# Patient Record
Sex: FEMALE | Race: WHITE | Employment: OTHER | URBAN - METROPOLITAN AREA
[De-identification: names, ages, dates, MRNs, and addresses within clinical notes are randomized per-mention and may not be internally consistent; named-entity substitution may affect disease eponyms.]

---

## 2017-03-08 ENCOUNTER — OP HISTORICAL/CONVERTED ENCOUNTER (OUTPATIENT)
Dept: OTHER | Age: 72
End: 2017-03-08

## 2017-05-23 ENCOUNTER — OP HISTORICAL/CONVERTED ENCOUNTER (OUTPATIENT)
Dept: OTHER | Age: 72
End: 2017-05-23

## 2018-11-05 ENCOUNTER — HOSPITAL ENCOUNTER (OUTPATIENT)
Dept: PREADMISSION TESTING | Age: 73
Discharge: HOME | End: 2018-11-05
Payer: MEDICARE

## 2018-11-05 VITALS
TEMPERATURE: 98.4 F | DIASTOLIC BLOOD PRESSURE: 75 MMHG | BODY MASS INDEX: 39.09 KG/M2 | HEIGHT: 64 IN | SYSTOLIC BLOOD PRESSURE: 146 MMHG | WEIGHT: 229 LBS | OXYGEN SATURATION: 97 % | RESPIRATION RATE: 14 BRPM | HEART RATE: 93 BPM

## 2018-11-05 LAB
ABO + RH BLD: NORMAL
ALBUMIN SERPL-MCNC: 3.7 G/DL (ref 3.5–5)
ALBUMIN/GLOB SERPL: 1.1 {RATIO} (ref 1.1–2.2)
ALP SERPL-CCNC: 82 U/L (ref 45–117)
ALT SERPL-CCNC: 30 U/L (ref 12–78)
ANION GAP SERPL CALC-SCNC: 11 MMOL/L (ref 5–15)
APPEARANCE UR: ABNORMAL
APTT PPP: 25.9 SEC (ref 22.1–32)
AST SERPL-CCNC: 25 U/L (ref 15–37)
ATRIAL RATE: 91 BPM
BACTERIA URNS QL MICRO: NEGATIVE /HPF
BASOPHILS # BLD: 0 K/UL (ref 0–0.1)
BASOPHILS NFR BLD: 1 % (ref 0–1)
BILIRUB SERPL-MCNC: 0.4 MG/DL (ref 0.2–1)
BILIRUB UR QL: NEGATIVE
BLOOD GROUP ANTIBODIES SERPL: NORMAL
BUN SERPL-MCNC: 9 MG/DL (ref 6–20)
BUN/CREAT SERPL: 13 (ref 12–20)
CALCIUM SERPL-MCNC: 9 MG/DL (ref 8.5–10.1)
CALCULATED P AXIS, ECG09: 58 DEGREES
CALCULATED R AXIS, ECG10: -25 DEGREES
CALCULATED T AXIS, ECG11: 57 DEGREES
CHLORIDE SERPL-SCNC: 102 MMOL/L (ref 97–108)
CO2 SERPL-SCNC: 28 MMOL/L (ref 21–32)
COLOR UR: ABNORMAL
CREAT SERPL-MCNC: 0.69 MG/DL (ref 0.55–1.02)
DIAGNOSIS, 93000: NORMAL
DIFFERENTIAL METHOD BLD: NORMAL
EOSINOPHIL # BLD: 0.2 K/UL (ref 0–0.4)
EOSINOPHIL NFR BLD: 2 % (ref 0–7)
EPITH CASTS URNS QL MICRO: ABNORMAL /LPF
ERYTHROCYTE [DISTWIDTH] IN BLOOD BY AUTOMATED COUNT: 14.1 % (ref 11.5–14.5)
EST. AVERAGE GLUCOSE BLD GHB EST-MCNC: 128 MG/DL
GLOBULIN SER CALC-MCNC: 3.4 G/DL (ref 2–4)
GLUCOSE SERPL-MCNC: 118 MG/DL (ref 65–100)
GLUCOSE UR STRIP.AUTO-MCNC: NEGATIVE MG/DL
HBA1C MFR BLD: 6.1 % (ref 4.2–6.3)
HCT VFR BLD AUTO: 41.4 % (ref 35–47)
HGB BLD-MCNC: 12.9 G/DL (ref 11.5–16)
HGB UR QL STRIP: NEGATIVE
HYALINE CASTS URNS QL MICRO: ABNORMAL /LPF (ref 0–5)
IMM GRANULOCYTES # BLD: 0 K/UL (ref 0–0.04)
IMM GRANULOCYTES NFR BLD AUTO: 0 % (ref 0–0.5)
INR PPP: 1 (ref 0.9–1.1)
KETONES UR QL STRIP.AUTO: NEGATIVE MG/DL
LEUKOCYTE ESTERASE UR QL STRIP.AUTO: NEGATIVE
LYMPHOCYTES # BLD: 1.9 K/UL (ref 0.8–3.5)
LYMPHOCYTES NFR BLD: 28 % (ref 12–49)
MCH RBC QN AUTO: 26.2 PG (ref 26–34)
MCHC RBC AUTO-ENTMCNC: 31.2 G/DL (ref 30–36.5)
MCV RBC AUTO: 84.1 FL (ref 80–99)
MONOCYTES # BLD: 0.7 K/UL (ref 0–1)
MONOCYTES NFR BLD: 10 % (ref 5–13)
NEUTS SEG # BLD: 4.1 K/UL (ref 1.8–8)
NEUTS SEG NFR BLD: 59 % (ref 32–75)
NITRITE UR QL STRIP.AUTO: NEGATIVE
NRBC # BLD: 0 K/UL (ref 0–0.01)
NRBC BLD-RTO: 0 PER 100 WBC
P-R INTERVAL, ECG05: 168 MS
PH UR STRIP: 7 [PH] (ref 5–8)
PLATELET # BLD AUTO: 184 K/UL (ref 150–400)
PMV BLD AUTO: 11.1 FL (ref 8.9–12.9)
POTASSIUM SERPL-SCNC: 3.3 MMOL/L (ref 3.5–5.1)
PROT SERPL-MCNC: 7.1 G/DL (ref 6.4–8.2)
PROT UR STRIP-MCNC: NEGATIVE MG/DL
PROTHROMBIN TIME: 10.4 SEC (ref 9–11.1)
Q-T INTERVAL, ECG07: 384 MS
QRS DURATION, ECG06: 78 MS
QTC CALCULATION (BEZET), ECG08: 472 MS
RBC # BLD AUTO: 4.92 M/UL (ref 3.8–5.2)
RBC #/AREA URNS HPF: ABNORMAL /HPF (ref 0–5)
SODIUM SERPL-SCNC: 141 MMOL/L (ref 136–145)
SP GR UR REFRACTOMETRY: <1.005 (ref 1–1.03)
SPECIMEN EXP DATE BLD: NORMAL
THERAPEUTIC RANGE,PTTT: NORMAL SECS (ref 58–77)
UA: UC IF INDICATED,UAUC: ABNORMAL
UROBILINOGEN UR QL STRIP.AUTO: 0.2 EU/DL (ref 0.2–1)
VENTRICULAR RATE, ECG03: 91 BPM
WBC # BLD AUTO: 6.9 K/UL (ref 3.6–11)
WBC URNS QL MICRO: ABNORMAL /HPF (ref 0–4)

## 2018-11-05 PROCEDURE — 93005 ELECTROCARDIOGRAM TRACING: CPT

## 2018-11-05 PROCEDURE — 81001 URINALYSIS AUTO W/SCOPE: CPT | Performed by: ORTHOPAEDIC SURGERY

## 2018-11-05 PROCEDURE — 86900 BLOOD TYPING SEROLOGIC ABO: CPT | Performed by: ORTHOPAEDIC SURGERY

## 2018-11-05 PROCEDURE — 36415 COLL VENOUS BLD VENIPUNCTURE: CPT | Performed by: ORTHOPAEDIC SURGERY

## 2018-11-05 PROCEDURE — 83036 HEMOGLOBIN GLYCOSYLATED A1C: CPT | Performed by: ORTHOPAEDIC SURGERY

## 2018-11-05 PROCEDURE — 85610 PROTHROMBIN TIME: CPT | Performed by: ORTHOPAEDIC SURGERY

## 2018-11-05 PROCEDURE — 85730 THROMBOPLASTIN TIME PARTIAL: CPT | Performed by: ORTHOPAEDIC SURGERY

## 2018-11-05 PROCEDURE — 80053 COMPREHEN METABOLIC PANEL: CPT | Performed by: ORTHOPAEDIC SURGERY

## 2018-11-05 PROCEDURE — 85025 COMPLETE CBC W/AUTO DIFF WBC: CPT | Performed by: ORTHOPAEDIC SURGERY

## 2018-11-05 RX ORDER — CHOLECALCIFEROL (VITAMIN D3) 125 MCG
4000 CAPSULE ORAL DAILY
COMMUNITY

## 2018-11-05 RX ORDER — BISMUTH SUBSALICYLATE 262 MG
1 TABLET,CHEWABLE ORAL DAILY
COMMUNITY

## 2018-11-05 RX ORDER — IBUPROFEN 200 MG
200 TABLET ORAL AS NEEDED
COMMUNITY
End: 2018-11-22

## 2018-11-05 RX ORDER — HYDROCHLOROTHIAZIDE 12.5 MG/1
12.5 CAPSULE ORAL
COMMUNITY

## 2018-11-05 RX ORDER — ALENDRONATE SODIUM 70 MG/1
70 TABLET ORAL
COMMUNITY

## 2018-11-05 NOTE — PERIOP NOTES
[address] [facility], [address]    MAIN OR [phone]    MAIN PRE OP [phone]    AMBULATORY PRE OP [phone]    PRE-ADMISSION TESTING [phone]       Surgery Date:   11/20/2018 Tuesday      Is surgery arrival time given by surgeon? NO  If NO, [facility] staff will call you between 3 and 7pm the day before your surgery with your arrival time. (If your surgery is on a Monday, we will call you the Friday before.)    Call [phone] after 7pm Monday-Friday if you did not receive your arrival time. Answers to Common Questions   When You  Arrive   Arrive at the [facility] [address] [facility] on the day of your surgery  Have your insurance card, photo ID, and any copayment (if needed)     Food   and   Drink   NO food or drink after midnight the night before surgery    This means NO water, gum, mints, coffee, juice, etc.  No alcohol (beer, wine, liquor) 24 hours before and after surgery     Medicine to   TAKE   Morning of Surgery   MEDICATIONS TO TAKE THE MORNING OF SURGERY WITH A SIP OF WATER:    Levothyroxine, Oxybutynin, Omeprazole. The morning of surgery ONLY do NOT take your Hydrochlorothiazide. Stop all vitamins, supplements, and ibuprofen 7 days before surgery.      Medicine  To  STOP   FOR PAIN   You can take Tylenol - follow instructions on the bottle   NO Aspirin for pain    NO Non-Steroidal Anti-Inflammatory Drugs (NSAIDs:   for example, Ibuprofen (Advil, Motrin), Naproxen (Aleve)   STOP herbal supplements and vitamins 1 week before surgery     Blood  Thinners    If you take Aspirin, Plavix, Coumadin, blood-thinning or anti-clot medicine, talk to your surgeon and/or follow the instructions from the doctor who told you to take that medicine     Clothing  Jewelry  Valuables  Bathing     CLOTHING   Wear loose, comfortable clothes   Wear glasses instead of contacts  [employer] money, jewelry and valuables at home   No make-up, particularly mascara, the day of surgery   REMOVE ALL piercings, rings, and jewelry - leave at home   Wear hair loose or down; no pony-tails, buns, or metal hair clips    BATHING   Follow all special bath instructions (for total joint replacement, spine and bowel surgeries.)   If you shower the morning of surgery, please do not apply any lotions, powders, or deodorants afterwards. Do not shave or trim anywhere 24 hours before surgery. Going Home  or  Spending the Night    SAME-DAY SURGERY: You must have a responsible adult drive you home and stay with you 24 hours after surgery   ADMITS: If your doctor is keeping you into the hospital after surgery, leave personal belongings/luggage in your car until you have a hospital room number. Hospital discharge time is 12 noon  Drivers must be here before 12 noon unless you are told differently         Follow all instructions so your surgery wont be cancelled. Please, be on time. If a situation occurs and you are delayed the day of surgery, call [phone] or [phone]. If your physical condition changes (like a fever, cold, flu, etc.) call your surgeon as soon as possible. The Preadmission Testing staff can be reached at [phone]. OTHER SPECIAL INSTRUCTIONS:    · Use Chlorhexidine Care Fusion wash and sponges 3 days prior to surgery as instructed. · Incentive spirometer given with instructions to practice at home and bring back to the hospital on the day of surgery. · [facility] will contact you if your Hemoglobin A1C is greater than 7.5. · Ensure/Glucerna  sample, nutritional information, and Ensure/Glucerna coupon given. · Pain pamphlet and Call Don't Fall reminder reviewed with patient.   ·  parking is complimentary Monday - Friday 7 am - 5 pm  · Bring PTA Medication list day of surgery with the last doses taken documented      Do not bring medication bottles the day of surgery    The patient was contacted  in person. She  verbalize  understanding of all instructions and does not  need reinforcement.

## 2018-11-05 NOTE — H&P
PAT Pre-Op History & Physical    Patient: Rupa Barboza                  MRN: [id number]          SSN: [id number]  YOB: 1945          Age: 71 y.o. Sex: female                Subjective:   Patient is a 71 y.o.  female who presents with history of chronic left knee pain that escalated after she had her right knee replaced 4/2018. States that her left knee pain is intermittent aching pain that she rates as high as 4/10 at times. Walking on uneven surfaces (grass for example) exacerbates her knee pain. She cannot walk as much as she would like due to her left knee pain. She has failed NSAIDs. The patient was evaluated in the surgeon's office and it was determined that the most appropriate plan of care is to proceed with surgical intervention. Patient's PCP Arsenio Chandler MD      Past Medical History:   Diagnosis Date    Arthritis     Breast cancer, left breast ([facility] [address] 1/23/2012    Diverticulosis     GERD (gastroesophageal reflux disease)     Hyperlipemia     Hypertension     Hypothyroidism     Obesity (BMI 30-39.9) 11/05/2018    BMI= 39.9    Phlebitis     Varicose veins of both lower extremities       Past Surgical History:   Procedure Laterality Date    HX APPENDECTOMY  1966    HX BREAST LUMPECTOMY Left 2012    also had lymph removed    HX COLONOSCOPY  2018    HX ORTHOPAEDIC Left 2002    L BUNIONECTOMY/HAMMERTOES    HX ORTHOPAEDIC  2010    repair hammertoes    HX ORTHOPAEDIC  2000    remove corns on both pinky toes    HX OTHER SURGICAL Bilateral     vein stripping    HX TUBAL LIGATION  1978      Prior to Admission medications    Medication Sig Start Date End Date Taking? Authorizing Provider   hydroCHLOROthiazide (MICROZIDE) 12.5 mg capsule Take 12.5 mg by mouth every morning. Yes Provider, Historical   alendronate (FOSAMAX) 70 mg tablet Take 70 mg by mouth every seven (7) days.    Yes Provider, Historical   multivitamin (ONE A DAY) tablet Take 1 Tab by mouth daily. Yes Provider, Historical   cholecalciferol, vitamin D3, (VITAMIN D3) 2,000 unit tab Take 4,000 Units by mouth daily. Yes Provider, Historical   OTHER Take 450 mg by mouth daily. \"Cranberry with vitamin C\"   Yes Provider, Historical   OTHER Take 2,000 mg by mouth three (3) days a week. \"Cinnamon with chromium\"   Yes Provider, Historical   FLAXSEED OIL-OMEGA 3,6,9 PO Take 2 Caps by mouth daily. Yes Provider, Historical   docosahexanoic acid/epa (FISH OIL PO) Take 2 Caps by mouth daily. Yes Provider, Historical   ibuprofen (MOTRIN) 200 mg tablet Take 200 mg by mouth as needed for Pain. Yes Provider, Historical   Levothyroxine 100 mcg cap Take 100 mcg by mouth Daily (before breakfast). Yes Provider, Historical   rosuvastatin (CRESTOR) 5 mg tablet Take 5 mg by mouth three (3) days a week. Yes Provider, Historical   oxybutynin (DITROPAN) 5 mg tablet Take 5 mg by mouth two (2) times a day. Yes Provider, Historical   omeprazole (PRILOSEC) 20 mg capsule Take 20 mg by mouth daily. Yes Provider, Historical     Current Outpatient Medications   Medication Sig    hydroCHLOROthiazide (MICROZIDE) 12.5 mg capsule Take 12.5 mg by mouth every morning.  alendronate (FOSAMAX) 70 mg tablet Take 70 mg by mouth every seven (7) days.  multivitamin (ONE A DAY) tablet Take 1 Tab by mouth daily.  cholecalciferol, vitamin D3, (VITAMIN D3) 2,000 unit tab Take 4,000 Units by mouth daily.  OTHER Take 450 mg by mouth daily. \"Cranberry with vitamin C\"    OTHER Take 2,000 mg by mouth three (3) days a week. \"Cinnamon with chromium\"    FLAXSEED OIL-OMEGA 3,6,9 PO Take 2 Caps by mouth daily.  docosahexanoic acid/epa (FISH OIL PO) Take 2 Caps by mouth daily.  ibuprofen (MOTRIN) 200 mg tablet Take 200 mg by mouth as needed for Pain.  Levothyroxine 100 mcg cap Take 100 mcg by mouth Daily (before breakfast).  rosuvastatin (CRESTOR) 5 mg tablet Take 5 mg by mouth three (3) days a week.     oxybutynin (DITROPAN) 5 mg tablet Take 5 mg by mouth two (2) times a day.  omeprazole (PRILOSEC) 20 mg capsule Take 20 mg by mouth daily. No current facility-administered medications for this encounter. Allergies   Allergen Reactions    Sulfa (Sulfonamide Antibiotics) Other (comments)     MOTHER EXTREMELY ALLERGIC-TOLD NOT TO TAKE      Social History     Tobacco Use    Smoking status: Former Smoker     Packs/day: 0.50     Years: 32.00     Pack years: 16.00     Last attempt to quit: 1998     Years since quittin.0    Smokeless tobacco: Never Used   Substance Use Topics    Alcohol use: Yes     Comment: rare- less than 1 drink/week      Social History     Substance and Sexual Activity   Drug Use No     Family History   Problem Relation Age of Onset    Other Daughter         FEELS LIKE COBWEBS ALL OVER HER    Alzheimer Mother     Ulcerative Colitis Mother     Dementia Father     Coronary Artery Disease Father         Bypass surgery  and stents    Arthritis-osteo Father     Arrhythmia Sister     Arthritis-osteo Sister     COPD Sister     Diabetes Sister     Arthritis-osteo Sister     Other Brother         PTSD, agent Orange exposure         Review of Systems    Patient denies difficulty swallowing, mouth sores, or loose teeth. Patient denies any recent dental procedures or any planned prior to surgery. Patient denies chest pain, tightness, pain radiating down left arm, palpitations. Denies dizziness, visual disturbances, or lightheadedness. Patient denies shortness of breath, wheezing, cough, fever, or chills. Patient denies diarrhea, constipation, or abdominal pain. Patient denies urinary problems including dysuria, hesitancy, or urgency. C/o stress incontinence at times. Denies skin breakdown, rashes, insect bites or open area. C/o left knee pain.         Objective:     Patient Vitals for the past 24 hrs:   Temp Pulse Resp BP SpO2   18 1307 98.4 °F (36.9 °C) 93 14 175/75 97 %     Temp (24hrs), Av.4 °F (36.9 °C), Min:98.4 °F (36.9 °C), Max:98.4 °F (36.9 °C)    Body mass index is 39.93 kg/m². Wt Readings from Last 1 Encounters:   18 103.9 kg (229 lb)        Physical Exam:     General: Pleasant,  cooperative, no apparent distress, appears stated age. Eyes: Conjunctivae/corneas clear. EOMs intact. Nose: Nares normal.   Mouth/Throat: Lips, mucosa, and tongue normal. Full upper and lower dentures in place. Neck: Supple, symmetrical, trachea midline. Back: Symmetric   Lungs: Clear to auscultation bilaterally. Heart: Regular rate and rhythm, S1, S2 normal. No murmur, click, rub or gallop. Abdomen: Soft, non-tender. Bowel sounds normal. No distention. Musculoskeletal:  Gait is antalgic. Extremities:  Extremities normal, atraumatic, no cyanosis. Trace ankle edema BLE. Calves                                 supple, non tender to palpation. Pulses: 2+ and symmetric bilateral upper extremities. Cap. refill <2 seconds   Skin: Skin color, texture, turgor normal.  No visible rashes or lesions. Neurologic: CN II-XII grossly intact. Alert and oriented x3. Labs:   Recent Results (from the past 72 hour(s))   CBC WITH AUTOMATED DIFF    Collection Time: 18  2:14 PM   Result Value Ref Range    WBC 6.9 3.6 - 11.0 K/uL    RBC 4.92 3.80 - 5.20 M/uL    HGB 12.9 11.5 - 16.0 g/dL    HCT 41.4 35.0 - 47.0 %    MCV 84.1 80.0 - 99.0 FL    MCH 26.2 26.0 - 34.0 PG    MCHC 31.2 30.0 - 36.5 g/dL    RDW 14.1 11.5 - 14.5 %    PLATELET 623 241 - 206 K/uL    MPV 11.1 8.9 - 12.9 FL    NRBC 0.0 0  WBC    ABSOLUTE NRBC 0.00 0.00 - 0.01 K/uL    NEUTROPHILS 59 32 - 75 %    LYMPHOCYTES 28 12 - 49 %    MONOCYTES 10 5 - 13 %    EOSINOPHILS 2 0 - 7 %    BASOPHILS 1 0 - 1 %    IMMATURE GRANULOCYTES 0 0.0 - 0.5 %    ABS. NEUTROPHILS 4.1 1.8 - 8.0 K/UL    ABS. LYMPHOCYTES 1.9 0.8 - 3.5 K/UL    ABS. MONOCYTES 0.7 0.0 - 1.0 K/UL    ABS. EOSINOPHILS 0.2 0.0 - 0.4 K/UL    ABS.  BASOPHILS 0.0 0.0 - 0.1 K/UL    ABS. IMM. GRANS. 0.0 0.00 - 0.04 K/UL    DF AUTOMATED     METABOLIC PANEL, COMPREHENSIVE    Collection Time: 11/05/18  2:14 PM   Result Value Ref Range    Sodium 141 136 - 145 mmol/L    Potassium 3.3 (L) 3.5 - 5.1 mmol/L    Chloride 102 97 - 108 mmol/L    CO2 28 21 - 32 mmol/L    Anion gap 11 5 - 15 mmol/L    Glucose 118 (H) 65 - 100 mg/dL    BUN 9 6 - 20 MG/DL    Creatinine 0.69 0.55 - 1.02 MG/DL    BUN/Creatinine ratio 13 12 - 20      GFR est AA >60 >60 ml/min/1.73m2    GFR est non-AA >60 >60 ml/min/1.73m2    Calcium 9.0 8.5 - 10.1 MG/DL    Bilirubin, total 0.4 0.2 - 1.0 MG/DL    ALT (SGPT) 30 12 - 78 U/L    AST (SGOT) 25 15 - 37 U/L    Alk.  phosphatase 82 45 - 117 U/L    Protein, total 7.1 6.4 - 8.2 g/dL    Albumin 3.7 3.5 - 5.0 g/dL    Globulin 3.4 2.0 - 4.0 g/dL    A-G Ratio 1.1 1.1 - 2.2     PROTHROMBIN TIME + INR    Collection Time: 11/05/18  2:14 PM   Result Value Ref Range    INR 1.0 0.9 - 1.1      Prothrombin time 10.4 9.0 - 11.1 sec   PTT    Collection Time: 11/05/18  2:14 PM   Result Value Ref Range    aPTT 25.9 22.1 - 32.0 sec    aPTT, therapeutic range     58.0 - 77.0 SECS   URINALYSIS W/ REFLEX CULTURE    Collection Time: 11/05/18  2:14 PM   Result Value Ref Range    Color YELLOW/STRAW      Appearance CLOUDY (A) CLEAR      Specific gravity <1.005 1.003 - 1.030    pH (UA) 7.0 5.0 - 8.0      Protein NEGATIVE  NEG mg/dL    Glucose NEGATIVE  NEG mg/dL    Ketone NEGATIVE  NEG mg/dL    Bilirubin NEGATIVE  NEG      Blood NEGATIVE  NEG      Urobilinogen 0.2 0.2 - 1.0 EU/dL    Nitrites NEGATIVE  NEG      Leukocyte Esterase NEGATIVE  NEG      WBC 0-4 0 - 4 /hpf    RBC 0-5 0 - 5 /hpf    Epithelial cells MODERATE (A) FEW /lpf    Bacteria NEGATIVE  NEG /hpf    UA:UC IF INDICATED CULTURE NOT INDICATED BY UA RESULT CNI      Hyaline cast 0-2 0 - 5 /lpf   TYPE & SCREEN    Collection Time: 11/05/18  2:14 PM   Result Value Ref Range    Crossmatch Expiration 11/19/2018     ABO/Rh(D) Melania Livingston POSITIVE Antibody screen NEG    EKG, 12 LEAD, INITIAL    Collection Time: 11/05/18  2:35 PM   Result Value Ref Range    Ventricular Rate 91 BPM    Atrial Rate 91 BPM    P-R Interval 168 ms    QRS Duration 78 ms    Q-T Interval 384 ms    QTC Calculation (Bezet) 472 ms    Calculated P Axis 58 degrees    Calculated R Axis -25 degrees    Calculated T Axis 57 degrees    Diagnosis       Normal sinus rhythm with sinus arrhythmia  Voltage criteria for left ventricular hypertrophy  Abnormal ECG  When compared with ECG of 13-JAN-2012 12:23,  No significant change was found  Confirmed by Marinell Kussmaul MD, [id number] ([id number]) on 11/5/2018 4:30:34 PM         Assessment:     Left knee osteoarthritis. History of lumpectomy and lymph node removal left breast.    Plan:     Scheduled for left total knee arthroplasty. Labs and EKG done per surgeon's orders. Lab results and EKG  reviewed- unremarkable. HgbA1c and MRSA pending. No BP/IV/Labs left arm.         Donal Diana NP

## 2018-11-06 LAB
BACTERIA SPEC CULT: NORMAL
BACTERIA SPEC CULT: NORMAL
SERVICE CMNT-IMP: NORMAL

## 2018-11-19 ENCOUNTER — ANESTHESIA EVENT (OUTPATIENT)
Dept: SURGERY | Age: 73
DRG: 470 | End: 2018-11-19
Payer: MEDICARE

## 2018-11-20 ENCOUNTER — ANESTHESIA (OUTPATIENT)
Dept: SURGERY | Age: 73
DRG: 470 | End: 2018-11-20
Payer: MEDICARE

## 2018-11-20 ENCOUNTER — HOSPITAL ENCOUNTER (INPATIENT)
Age: 73
LOS: 2 days | Discharge: HOME HEALTH | DRG: 470 | End: 2018-11-22
Attending: ORTHOPAEDIC SURGERY | Admitting: ORTHOPAEDIC SURGERY
Payer: MEDICARE

## 2018-11-20 ENCOUNTER — APPOINTMENT (OUTPATIENT)
Dept: GENERAL RADIOLOGY | Age: 73
DRG: 470 | End: 2018-11-20
Attending: ORTHOPAEDIC SURGERY
Payer: MEDICARE

## 2018-11-20 DIAGNOSIS — M17.12 ARTHRITIS OF KNEE, LEFT: Primary | ICD-10-CM

## 2018-11-20 PROBLEM — E03.9 ACQUIRED HYPOTHYROIDISM: Status: ACTIVE | Noted: 2018-11-20

## 2018-11-20 PROBLEM — I10 ESSENTIAL HYPERTENSION: Status: ACTIVE | Noted: 2018-11-20

## 2018-11-20 LAB
ABO + RH BLD: NORMAL
BLOOD GROUP ANTIBODIES SERPL: NORMAL
SPECIMEN EXP DATE BLD: NORMAL

## 2018-11-20 PROCEDURE — 74011250636 HC RX REV CODE- 250/636: Performed by: ORTHOPAEDIC SURGERY

## 2018-11-20 PROCEDURE — 77030019557 HC ELECTRD VES SEAL MEDT -F: Performed by: ORTHOPAEDIC SURGERY

## 2018-11-20 PROCEDURE — 77030019707 HC TBNG LAVG CRBJT KINM -C: Performed by: ORTHOPAEDIC SURGERY

## 2018-11-20 PROCEDURE — 97161 PT EVAL LOW COMPLEX 20 MIN: CPT

## 2018-11-20 PROCEDURE — 74011250636 HC RX REV CODE- 250/636: Performed by: ANESTHESIOLOGY

## 2018-11-20 PROCEDURE — 77030027138 HC INCENT SPIROMETER -A

## 2018-11-20 PROCEDURE — 76060000064 HC AMB SURG ANES 2 TO 2.5 HR: Performed by: ORTHOPAEDIC SURGERY

## 2018-11-20 PROCEDURE — 77030031139 HC SUT VCRL2 J&J -A: Performed by: ORTHOPAEDIC SURGERY

## 2018-11-20 PROCEDURE — 77030012935 HC DRSG AQUACEL BMS -B: Performed by: ORTHOPAEDIC SURGERY

## 2018-11-20 PROCEDURE — 74011250636 HC RX REV CODE- 250/636

## 2018-11-20 PROCEDURE — 77030018836 HC SOL IRR NACL ICUM -A: Performed by: ORTHOPAEDIC SURGERY

## 2018-11-20 PROCEDURE — 74011000250 HC RX REV CODE- 250

## 2018-11-20 PROCEDURE — C1713 ANCHOR/SCREW BN/BN,TIS/BN: HCPCS | Performed by: ORTHOPAEDIC SURGERY

## 2018-11-20 PROCEDURE — C1776 JOINT DEVICE (IMPLANTABLE): HCPCS | Performed by: ORTHOPAEDIC SURGERY

## 2018-11-20 PROCEDURE — 77030018822 HC SLV COMPR FT COVD -B

## 2018-11-20 PROCEDURE — 73560 X-RAY EXAM OF KNEE 1 OR 2: CPT

## 2018-11-20 PROCEDURE — 74011250637 HC RX REV CODE- 250/637: Performed by: ANESTHESIOLOGY

## 2018-11-20 PROCEDURE — 74011250637 HC RX REV CODE- 250/637: Performed by: ORTHOPAEDIC SURGERY

## 2018-11-20 PROCEDURE — 77030018842 HC SOL IRR SOD CL 9% BAXT -A: Performed by: ORTHOPAEDIC SURGERY

## 2018-11-20 PROCEDURE — 86901 BLOOD TYPING SEROLOGIC RH(D): CPT

## 2018-11-20 PROCEDURE — 77030007866 HC KT SPN ANES BBMI -B: Performed by: ANESTHESIOLOGY

## 2018-11-20 PROCEDURE — 77030000032 HC CUF TRNQT ZIMM -B: Performed by: ORTHOPAEDIC SURGERY

## 2018-11-20 PROCEDURE — 77030016547 HC BLD SAW SAG1 STRY -B: Performed by: ORTHOPAEDIC SURGERY

## 2018-11-20 PROCEDURE — 36415 COLL VENOUS BLD VENIPUNCTURE: CPT

## 2018-11-20 PROCEDURE — 77030020782 HC GWN BAIR PAWS FLX 3M -B

## 2018-11-20 PROCEDURE — 76030000021 HC AMB SURG 2 TO 2.5 HR INTENSV-TIER 1: Performed by: ORTHOPAEDIC SURGERY

## 2018-11-20 PROCEDURE — 77030003601 HC NDL NRV BLK BBMI -A: Performed by: ANESTHESIOLOGY

## 2018-11-20 PROCEDURE — 77030028907 HC WRP KNEE WO BGS SOLM -B

## 2018-11-20 PROCEDURE — 77030002933 HC SUT MCRYL J&J -A: Performed by: ORTHOPAEDIC SURGERY

## 2018-11-20 PROCEDURE — 77030033067 HC SUT PDO STRATFX SPIR J&J -B: Performed by: ORTHOPAEDIC SURGERY

## 2018-11-20 PROCEDURE — 77030013708 HC HNDPC SUC IRR PULS STRY –B: Performed by: ORTHOPAEDIC SURGERY

## 2018-11-20 PROCEDURE — 77030039266 HC ADH SKN EXOFIN S2SG -A: Performed by: ORTHOPAEDIC SURGERY

## 2018-11-20 PROCEDURE — 74011000250 HC RX REV CODE- 250: Performed by: ORTHOPAEDIC SURGERY

## 2018-11-20 PROCEDURE — 77030020268 HC MISC GENERAL SUPPLY: Performed by: ORTHOPAEDIC SURGERY

## 2018-11-20 PROCEDURE — 74011000272 HC RX REV CODE- 272: Performed by: ORTHOPAEDIC SURGERY

## 2018-11-20 PROCEDURE — 77030011640 HC PAD GRND REM COVD -A: Performed by: ORTHOPAEDIC SURGERY

## 2018-11-20 PROCEDURE — 76210000032 HC AMBSU PH I REC 3 TO 3.5 HR: Performed by: ORTHOPAEDIC SURGERY

## 2018-11-20 PROCEDURE — 0SRD0J9 REPLACEMENT OF LEFT KNEE JOINT WITH SYNTHETIC SUBSTITUTE, CEMENTED, OPEN APPROACH: ICD-10-PCS | Performed by: ORTHOPAEDIC SURGERY

## 2018-11-20 PROCEDURE — 97116 GAIT TRAINING THERAPY: CPT

## 2018-11-20 PROCEDURE — 3E0T3BZ INTRODUCTION OF ANESTHETIC AGENT INTO PERIPHERAL NERVES AND PLEXI, PERCUTANEOUS APPROACH: ICD-10-PCS | Performed by: ANESTHESIOLOGY

## 2018-11-20 PROCEDURE — 77030010782 HC BOWL MX BN ENCR -B: Performed by: ORTHOPAEDIC SURGERY

## 2018-11-20 PROCEDURE — 65270000029 HC RM PRIVATE

## 2018-11-20 PROCEDURE — 64447 NJX AA&/STRD FEMORAL NRV IMG: CPT | Performed by: ANESTHESIOLOGY

## 2018-11-20 PROCEDURE — 74011000258 HC RX REV CODE- 258

## 2018-11-20 PROCEDURE — 77030018673: Performed by: ORTHOPAEDIC SURGERY

## 2018-11-20 DEVICE — TOTAL KNEE REPLACEMENT KIT PRIMARY E-PLUS: Type: IMPLANTABLE DEVICE | Status: FUNCTIONAL

## 2018-11-20 DEVICE — COBALT G-HV BONE CEMENT 40GM
Type: IMPLANTABLE DEVICE | Site: KNEE | Status: FUNCTIONAL
Brand: DJO SURGICAL

## 2018-11-20 RX ORDER — SODIUM CHLORIDE 0.9 % (FLUSH) 0.9 %
5-10 SYRINGE (ML) INJECTION EVERY 8 HOURS
Status: DISCONTINUED | OUTPATIENT
Start: 2018-11-21 | End: 2018-11-22 | Stop reason: HOSPADM

## 2018-11-20 RX ORDER — FENTANYL CITRATE 50 UG/ML
25 INJECTION, SOLUTION INTRAMUSCULAR; INTRAVENOUS
Status: DISCONTINUED | OUTPATIENT
Start: 2018-11-20 | End: 2018-11-20 | Stop reason: HOSPADM

## 2018-11-20 RX ORDER — OMEPRAZOLE 20 MG/1
20 CAPSULE, DELAYED RELEASE ORAL DAILY
Status: DISCONTINUED | OUTPATIENT
Start: 2018-11-21 | End: 2018-11-20 | Stop reason: CLARIF

## 2018-11-20 RX ORDER — ONDANSETRON 2 MG/ML
4 INJECTION INTRAMUSCULAR; INTRAVENOUS
Status: DISCONTINUED | OUTPATIENT
Start: 2018-11-20 | End: 2018-11-22 | Stop reason: HOSPADM

## 2018-11-20 RX ORDER — DEXAMETHASONE SODIUM PHOSPHATE 4 MG/ML
INJECTION, SOLUTION INTRA-ARTICULAR; INTRALESIONAL; INTRAMUSCULAR; INTRAVENOUS; SOFT TISSUE AS NEEDED
Status: DISCONTINUED | OUTPATIENT
Start: 2018-11-20 | End: 2018-11-20 | Stop reason: HOSPADM

## 2018-11-20 RX ORDER — LIDOCAINE HYDROCHLORIDE 10 MG/ML
0.1 INJECTION, SOLUTION EPIDURAL; INFILTRATION; INTRACAUDAL; PERINEURAL AS NEEDED
Status: DISCONTINUED | OUTPATIENT
Start: 2018-11-20 | End: 2018-11-20 | Stop reason: HOSPADM

## 2018-11-20 RX ORDER — PROPOFOL 10 MG/ML
INJECTION, EMULSION INTRAVENOUS
Status: DISCONTINUED | OUTPATIENT
Start: 2018-11-20 | End: 2018-11-20 | Stop reason: HOSPADM

## 2018-11-20 RX ORDER — AMOXICILLIN 250 MG
1 CAPSULE ORAL 2 TIMES DAILY
Status: DISCONTINUED | OUTPATIENT
Start: 2018-11-20 | End: 2018-11-22 | Stop reason: HOSPADM

## 2018-11-20 RX ORDER — ACETAMINOPHEN 500 MG
1000 TABLET ORAL EVERY 6 HOURS
Status: DISCONTINUED | OUTPATIENT
Start: 2018-11-20 | End: 2018-11-22 | Stop reason: HOSPADM

## 2018-11-20 RX ORDER — ROPIVACAINE HYDROCHLORIDE 5 MG/ML
INJECTION, SOLUTION EPIDURAL; INFILTRATION; PERINEURAL AS NEEDED
Status: DISCONTINUED | OUTPATIENT
Start: 2018-11-20 | End: 2018-11-20 | Stop reason: HOSPADM

## 2018-11-20 RX ORDER — SODIUM CHLORIDE, SODIUM LACTATE, POTASSIUM CHLORIDE, CALCIUM CHLORIDE 600; 310; 30; 20 MG/100ML; MG/100ML; MG/100ML; MG/100ML
125 INJECTION, SOLUTION INTRAVENOUS CONTINUOUS
Status: DISCONTINUED | OUTPATIENT
Start: 2018-11-20 | End: 2018-11-20 | Stop reason: HOSPADM

## 2018-11-20 RX ORDER — NALBUPHINE HYDROCHLORIDE 10 MG/ML
5 INJECTION, SOLUTION INTRAMUSCULAR; INTRAVENOUS; SUBCUTANEOUS
Status: DISCONTINUED | OUTPATIENT
Start: 2018-11-20 | End: 2018-11-22 | Stop reason: HOSPADM

## 2018-11-20 RX ORDER — THERA TABS 400 MCG
1 TAB ORAL DAILY
Status: DISCONTINUED | OUTPATIENT
Start: 2018-11-21 | End: 2018-11-22 | Stop reason: HOSPADM

## 2018-11-20 RX ORDER — ASPIRIN 325 MG
325 TABLET, DELAYED RELEASE (ENTERIC COATED) ORAL 2 TIMES DAILY
Status: DISCONTINUED | OUTPATIENT
Start: 2018-11-20 | End: 2018-11-22 | Stop reason: HOSPADM

## 2018-11-20 RX ORDER — CEFAZOLIN SODIUM/WATER 2 G/20 ML
2 SYRINGE (ML) INTRAVENOUS ONCE
Status: COMPLETED | OUTPATIENT
Start: 2018-11-20 | End: 2018-11-20

## 2018-11-20 RX ORDER — HYDROMORPHONE HYDROCHLORIDE 1 MG/ML
.25-1 INJECTION, SOLUTION INTRAMUSCULAR; INTRAVENOUS; SUBCUTANEOUS
Status: DISCONTINUED | OUTPATIENT
Start: 2018-11-20 | End: 2018-11-20 | Stop reason: HOSPADM

## 2018-11-20 RX ORDER — BISMUTH SUBSALICYLATE 262 MG
1 TABLET,CHEWABLE ORAL DAILY
Status: DISCONTINUED | OUTPATIENT
Start: 2018-11-21 | End: 2018-11-20 | Stop reason: CLARIF

## 2018-11-20 RX ORDER — PANTOPRAZOLE SODIUM 40 MG/1
40 TABLET, DELAYED RELEASE ORAL DAILY
Status: DISCONTINUED | OUTPATIENT
Start: 2018-11-21 | End: 2018-11-22 | Stop reason: HOSPADM

## 2018-11-20 RX ORDER — FENTANYL CITRATE 50 UG/ML
INJECTION, SOLUTION INTRAMUSCULAR; INTRAVENOUS AS NEEDED
Status: DISCONTINUED | OUTPATIENT
Start: 2018-11-20 | End: 2018-11-20 | Stop reason: HOSPADM

## 2018-11-20 RX ORDER — ONDANSETRON 2 MG/ML
INJECTION INTRAMUSCULAR; INTRAVENOUS AS NEEDED
Status: DISCONTINUED | OUTPATIENT
Start: 2018-11-20 | End: 2018-11-20 | Stop reason: HOSPADM

## 2018-11-20 RX ORDER — LEVOTHYROXINE SODIUM 100 UG/1
100 TABLET ORAL
Status: DISCONTINUED | OUTPATIENT
Start: 2018-11-21 | End: 2018-11-22 | Stop reason: HOSPADM

## 2018-11-20 RX ORDER — TRAMADOL HYDROCHLORIDE 50 MG/1
100 TABLET ORAL
Status: DISCONTINUED | OUTPATIENT
Start: 2018-11-20 | End: 2018-11-22 | Stop reason: HOSPADM

## 2018-11-20 RX ORDER — CEFAZOLIN SODIUM/WATER 2 G/20 ML
2 SYRINGE (ML) INTRAVENOUS EVERY 8 HOURS
Status: COMPLETED | OUTPATIENT
Start: 2018-11-20 | End: 2018-11-21

## 2018-11-20 RX ORDER — OXYCODONE HYDROCHLORIDE 5 MG/1
10 TABLET ORAL
Status: DISCONTINUED | OUTPATIENT
Start: 2018-11-20 | End: 2018-11-22 | Stop reason: HOSPADM

## 2018-11-20 RX ORDER — HYDROCHLOROTHIAZIDE 25 MG/1
12.5 TABLET ORAL DAILY
Status: DISCONTINUED | OUTPATIENT
Start: 2018-11-21 | End: 2018-11-20

## 2018-11-20 RX ORDER — SODIUM CHLORIDE 9 MG/ML
125 INJECTION, SOLUTION INTRAVENOUS CONTINUOUS
Status: DISPENSED | OUTPATIENT
Start: 2018-11-20 | End: 2018-11-21

## 2018-11-20 RX ORDER — OXYBUTYNIN CHLORIDE 5 MG/1
5 TABLET ORAL 2 TIMES DAILY
Status: DISCONTINUED | OUTPATIENT
Start: 2018-11-20 | End: 2018-11-22 | Stop reason: HOSPADM

## 2018-11-20 RX ORDER — DIPHENHYDRAMINE HYDROCHLORIDE 50 MG/ML
12.5 INJECTION, SOLUTION INTRAMUSCULAR; INTRAVENOUS AS NEEDED
Status: DISCONTINUED | OUTPATIENT
Start: 2018-11-20 | End: 2018-11-20 | Stop reason: HOSPADM

## 2018-11-20 RX ORDER — NALOXONE HYDROCHLORIDE 0.4 MG/ML
0.4 INJECTION, SOLUTION INTRAMUSCULAR; INTRAVENOUS; SUBCUTANEOUS AS NEEDED
Status: DISCONTINUED | OUTPATIENT
Start: 2018-11-20 | End: 2018-11-22 | Stop reason: HOSPADM

## 2018-11-20 RX ORDER — SODIUM CHLORIDE 0.9 % (FLUSH) 0.9 %
5-10 SYRINGE (ML) INJECTION AS NEEDED
Status: DISCONTINUED | OUTPATIENT
Start: 2018-11-20 | End: 2018-11-22 | Stop reason: HOSPADM

## 2018-11-20 RX ORDER — MIDAZOLAM HYDROCHLORIDE 1 MG/ML
2 INJECTION, SOLUTION INTRAMUSCULAR; INTRAVENOUS
Status: DISCONTINUED | OUTPATIENT
Start: 2018-11-20 | End: 2018-11-20 | Stop reason: HOSPADM

## 2018-11-20 RX ORDER — ROSUVASTATIN CALCIUM 10 MG/1
5 TABLET, COATED ORAL
Status: DISCONTINUED | OUTPATIENT
Start: 2018-11-21 | End: 2018-11-22 | Stop reason: HOSPADM

## 2018-11-20 RX ORDER — OXYCODONE HCL 10 MG/1
10 TABLET, FILM COATED, EXTENDED RELEASE ORAL ONCE
Status: COMPLETED | OUTPATIENT
Start: 2018-11-20 | End: 2018-11-20

## 2018-11-20 RX ORDER — BUPIVACAINE HYDROCHLORIDE 7.5 MG/ML
INJECTION, SOLUTION EPIDURAL; RETROBULBAR AS NEEDED
Status: DISCONTINUED | OUTPATIENT
Start: 2018-11-20 | End: 2018-11-20 | Stop reason: HOSPADM

## 2018-11-20 RX ORDER — FLUMAZENIL 0.1 MG/ML
0.2 INJECTION INTRAVENOUS
Status: DISCONTINUED | OUTPATIENT
Start: 2018-11-20 | End: 2018-11-20 | Stop reason: HOSPADM

## 2018-11-20 RX ORDER — ACETAMINOPHEN 325 MG/1
975 TABLET ORAL ONCE
Status: COMPLETED | OUTPATIENT
Start: 2018-11-20 | End: 2018-11-20

## 2018-11-20 RX ORDER — OXYCODONE HYDROCHLORIDE 5 MG/1
5 TABLET ORAL
Status: DISCONTINUED | OUTPATIENT
Start: 2018-11-20 | End: 2018-11-20 | Stop reason: HOSPADM

## 2018-11-20 RX ORDER — MIDAZOLAM HYDROCHLORIDE 1 MG/ML
INJECTION, SOLUTION INTRAMUSCULAR; INTRAVENOUS AS NEEDED
Status: DISCONTINUED | OUTPATIENT
Start: 2018-11-20 | End: 2018-11-20 | Stop reason: HOSPADM

## 2018-11-20 RX ORDER — FACIAL-BODY WIPES
10 EACH TOPICAL DAILY PRN
Status: DISCONTINUED | OUTPATIENT
Start: 2018-11-22 | End: 2018-11-22 | Stop reason: HOSPADM

## 2018-11-20 RX ORDER — NALOXONE HYDROCHLORIDE 0.4 MG/ML
0.2 INJECTION, SOLUTION INTRAMUSCULAR; INTRAVENOUS; SUBCUTANEOUS
Status: DISCONTINUED | OUTPATIENT
Start: 2018-11-20 | End: 2018-11-20 | Stop reason: HOSPADM

## 2018-11-20 RX ORDER — GLUCOSAM/CHONDRO/HERB 149/HYAL 750-100 MG
2 TABLET ORAL DAILY
Status: DISCONTINUED | OUTPATIENT
Start: 2018-11-21 | End: 2018-11-22 | Stop reason: HOSPADM

## 2018-11-20 RX ORDER — POLYETHYLENE GLYCOL 3350 17 G/17G
17 POWDER, FOR SOLUTION ORAL DAILY
Status: DISCONTINUED | OUTPATIENT
Start: 2018-11-21 | End: 2018-11-22 | Stop reason: HOSPADM

## 2018-11-20 RX ORDER — PROPOFOL 10 MG/ML
INJECTION, EMULSION INTRAVENOUS AS NEEDED
Status: DISCONTINUED | OUTPATIENT
Start: 2018-11-20 | End: 2018-11-20 | Stop reason: HOSPADM

## 2018-11-20 RX ORDER — ACETAMINOPHEN 325 MG/1
650 TABLET ORAL
COMMUNITY

## 2018-11-20 RX ORDER — KETOROLAC TROMETHAMINE 30 MG/ML
15 INJECTION, SOLUTION INTRAMUSCULAR; INTRAVENOUS
Status: COMPLETED | OUTPATIENT
Start: 2018-11-20 | End: 2018-11-20

## 2018-11-20 RX ORDER — MELATONIN
4000 DAILY
Status: DISCONTINUED | OUTPATIENT
Start: 2018-11-21 | End: 2018-11-22 | Stop reason: HOSPADM

## 2018-11-20 RX ORDER — HYDROMORPHONE HYDROCHLORIDE 2 MG/ML
0.5 INJECTION, SOLUTION INTRAMUSCULAR; INTRAVENOUS; SUBCUTANEOUS
Status: DISPENSED | OUTPATIENT
Start: 2018-11-20 | End: 2018-11-21

## 2018-11-20 RX ADMIN — SODIUM CHLORIDE 125 ML/HR: 900 INJECTION, SOLUTION INTRAVENOUS at 19:55

## 2018-11-20 RX ADMIN — ACETAMINOPHEN 975 MG: 325 TABLET, FILM COATED ORAL at 07:20

## 2018-11-20 RX ADMIN — PROPOFOL 50 MCG/KG/MIN: 10 INJECTION, EMULSION INTRAVENOUS at 08:25

## 2018-11-20 RX ADMIN — HYDROMORPHONE HYDROCHLORIDE 0.5 MG: 2 INJECTION, SOLUTION INTRAMUSCULAR; INTRAVENOUS; SUBCUTANEOUS at 22:30

## 2018-11-20 RX ADMIN — HYDROMORPHONE HYDROCHLORIDE 0.5 MG: 2 INJECTION, SOLUTION INTRAMUSCULAR; INTRAVENOUS; SUBCUTANEOUS at 15:52

## 2018-11-20 RX ADMIN — DEXAMETHASONE SODIUM PHOSPHATE 4 MG: 4 INJECTION, SOLUTION INTRA-ARTICULAR; INTRALESIONAL; INTRAMUSCULAR; INTRAVENOUS; SOFT TISSUE at 08:01

## 2018-11-20 RX ADMIN — MIDAZOLAM HYDROCHLORIDE 1 MG: 1 INJECTION, SOLUTION INTRAMUSCULAR; INTRAVENOUS at 07:56

## 2018-11-20 RX ADMIN — Medication 2 G: at 08:30

## 2018-11-20 RX ADMIN — PROPOFOL 20 MG: 10 INJECTION, EMULSION INTRAVENOUS at 08:25

## 2018-11-20 RX ADMIN — SODIUM CHLORIDE, SODIUM LACTATE, POTASSIUM CHLORIDE, AND CALCIUM CHLORIDE 1000 ML: 600; 310; 30; 20 INJECTION, SOLUTION INTRAVENOUS at 07:20

## 2018-11-20 RX ADMIN — KETOROLAC TROMETHAMINE 15 MG: 30 INJECTION, SOLUTION INTRAMUSCULAR; INTRAVENOUS at 11:46

## 2018-11-20 RX ADMIN — Medication 2 G: at 15:52

## 2018-11-20 RX ADMIN — FENTANYL CITRATE 50 MCG: 50 INJECTION, SOLUTION INTRAMUSCULAR; INTRAVENOUS at 08:00

## 2018-11-20 RX ADMIN — SODIUM CHLORIDE, POTASSIUM CHLORIDE, SODIUM LACTATE AND CALCIUM CHLORIDE: 600; 310; 30; 20 INJECTION, SOLUTION INTRAVENOUS at 07:15

## 2018-11-20 RX ADMIN — ASPIRIN 325 MG: 325 TABLET, DELAYED RELEASE ORAL at 19:21

## 2018-11-20 RX ADMIN — ONDANSETRON 4 MG: 2 INJECTION INTRAMUSCULAR; INTRAVENOUS at 08:53

## 2018-11-20 RX ADMIN — ACETAMINOPHEN 1000 MG: 325 TABLET, FILM COATED ORAL at 19:21

## 2018-11-20 RX ADMIN — SODIUM CHLORIDE, POTASSIUM CHLORIDE, SODIUM LACTATE AND CALCIUM CHLORIDE: 600; 310; 30; 20 INJECTION, SOLUTION INTRAVENOUS at 09:38

## 2018-11-20 RX ADMIN — PROPOFOL 20 MG: 10 INJECTION, EMULSION INTRAVENOUS at 09:16

## 2018-11-20 RX ADMIN — ROPIVACAINE HYDROCHLORIDE 30 ML: 5 INJECTION, SOLUTION EPIDURAL; INFILTRATION; PERINEURAL at 08:01

## 2018-11-20 RX ADMIN — ACETAMINOPHEN 1000 MG: 325 TABLET, FILM COATED ORAL at 13:15

## 2018-11-20 RX ADMIN — OXYCODONE HYDROCHLORIDE 10 MG: 10 TABLET, FILM COATED, EXTENDED RELEASE ORAL at 07:20

## 2018-11-20 RX ADMIN — SODIUM CHLORIDE, POTASSIUM CHLORIDE, SODIUM LACTATE AND CALCIUM CHLORIDE: 600; 310; 30; 20 INJECTION, SOLUTION INTRAVENOUS at 08:39

## 2018-11-20 RX ADMIN — HYDROMORPHONE HYDROCHLORIDE 0.5 MG: 1 INJECTION, SOLUTION INTRAMUSCULAR; INTRAVENOUS; SUBCUTANEOUS at 12:53

## 2018-11-20 RX ADMIN — SENNOSIDES AND DOCUSATE SODIUM 1 TABLET: 8.6; 5 TABLET ORAL at 19:21

## 2018-11-20 RX ADMIN — OXYBUTYNIN CHLORIDE 5 MG: 5 TABLET ORAL at 19:21

## 2018-11-20 RX ADMIN — FENTANYL CITRATE 50 MCG: 50 INJECTION, SOLUTION INTRAMUSCULAR; INTRAVENOUS at 07:55

## 2018-11-20 RX ADMIN — SODIUM CHLORIDE 125 ML/HR: 900 INJECTION, SOLUTION INTRAVENOUS at 10:53

## 2018-11-20 RX ADMIN — MIDAZOLAM HYDROCHLORIDE 2 MG: 1 INJECTION, SOLUTION INTRAMUSCULAR; INTRAVENOUS at 07:55

## 2018-11-20 RX ADMIN — BUPIVACAINE HYDROCHLORIDE 2.5 ML: 7.5 INJECTION, SOLUTION EPIDURAL; RETROBULBAR at 08:10

## 2018-11-20 NOTE — ANESTHESIA PROCEDURE NOTES
Spinal Block Start time: 11/20/2018 8:02 AM 
End time: 11/20/2018 8:10 AM 
Performed by: Khoi Lennon CRNA Authorized by: Jacob Viramontes MD  
 
Pre-procedure: Indications: at surgeon's request and primary anesthetic  Preanesthetic Checklist: patient identified, risks and benefits discussed, anesthesia consent, site marked, patient being monitored and timeout performed Timeout Time: 08:02 Spinal Block:  
Patient Position:  Seated Prep Region:  Lumbar Prep: chlorhexidine Location:  L3-4 Technique:  Single shot Needle:  
Needle Type:  Pencan Needle Gauge:  25 G Attempts:  1 Events: CSF confirmed, no blood with aspiration and no paresthesia Assessment: 
Insertion:  Uncomplicated Patient tolerance:  Patient tolerated the procedure well with no immediate complications

## 2018-11-20 NOTE — PROGRESS NOTES
Primary Nurse Tito Cohen and Magen Carrera RN performed a dual skin assessment on this patient Impairment noted- see wound doc flow sheet  Jacob score is 20.

## 2018-11-20 NOTE — CONSULTS
Consult History and Physical Exam    NAME:  Humera Laws   :   1945   MRN:  [id number]     PCP:  Sandy Leonardo MD   Referring: Vinny Dewey MD     Date/Time:  2018           Assessment/Plan:       Essential hypertension (2018): Had problems with hypotension after last orthopedic surgery. -- would hold HCTZ for now    Acquired hypothyroidism (2018):  -- continue LT4    Hyperlipemia ():  -- agree with resuming statin     Arthritis of knee, left (2018):  -- DVT prophylaxis, rehab, and pain management per ortho team    Nausea:  Likely from surgery / anesthesia. No vomiting.  -- add antiemetics prn    Thank you for consulting [facility]. Subjective:   REASON FOR CONSULT:  Medical management    CHIEF COMPLAINT:  Nausea    HISTORY OF PRESENT ILLNESS:     Ms. Mike Barry is a 71 y.o.  female who is admitted for TKR. Ms. Mike Barry today complains of nausea post op. No vomiting. No chest pains. No abd pain. Has mild pain from surgical site, but improved after dilaudid. Reports issues with low BP after last surgery. Antihypertensives have since been stopped by PCP except for HCTZ. Feels a bit lightheaded in bed.       Past Medical History:   Diagnosis Date    Arthritis     Breast cancer, left breast ([facility] [address] 2012    Diverticulosis     GERD (gastroesophageal reflux disease)     Hyperlipemia     Hypertension     Hypothyroidism     Nausea & vomiting     Obesity (BMI 30-39.9) 2018    BMI= 39.9    Phlebitis     Varicose veins of both lower extremities         Past Surgical History:   Procedure Laterality Date    HX APPENDECTOMY      HX BREAST LUMPECTOMY Left     also had lymph removed    HX COLONOSCOPY  2018    HX ORTHOPAEDIC Left     L BUNIONECTOMY/HAMMERTOES    HX ORTHOPAEDIC  2010    repair hammertoes    HX ORTHOPAEDIC  2000    remove corns on both pinky toes    HX OTHER SURGICAL Bilateral     vein stripping  HX TUBAL LIGATION         Social History     Tobacco Use    Smoking status: Former Smoker     Packs/day: 0.50     Years: 32.00     Pack years: 16.00     Last attempt to quit: 1998     Years since quittin.0    Smokeless tobacco: Never Used   Substance Use Topics    Alcohol use: Yes     Comment: rare- less than 1 drink/week        Family History   Problem Relation Age of Onset    Other Daughter         FEELS LIKE COBWEBS ALL OVER HER    Alzheimer Mother     Ulcerative Colitis Mother     Dementia Father     Coronary Artery Disease Father         Bypass surgery  and stents    Arthritis-osteo Father     Arrhythmia Sister     Arthritis-osteo Sister     COPD Sister     Diabetes Sister     Arthritis-osteo Sister     Other Brother         PTSD, agent Orange exposure        Allergies   Allergen Reactions    Sulfa (Sulfonamide Antibiotics) Other (comments)     MOTHER EXTREMELY ALLERGIC-TOLD NOT TO TAKE        Prior to Admission medications    Medication Sig Start Date End Date Taking? Authorizing Provider   acetaminophen (TYLENOL) 325 mg tablet Take 650 mg by mouth every four (4) hours as needed for Pain. Yes Provider, Historical   oxybutynin (DITROPAN) 5 mg tablet Take 5 mg by mouth two (2) times a day. Yes Provider, Historical   omeprazole (PRILOSEC) 20 mg capsule Take 20 mg by mouth daily. Yes Provider, Historical   hydroCHLOROthiazide (MICROZIDE) 12.5 mg capsule Take 12.5 mg by mouth every morning. Provider, Historical   alendronate (FOSAMAX) 70 mg tablet Take 70 mg by mouth every seven (7) days. Provider, Historical   multivitamin (ONE A DAY) tablet Take 1 Tab by mouth daily. Provider, Historical   cholecalciferol, vitamin D3, (VITAMIN D3) 2,000 unit tab Take 4,000 Units by mouth daily. Provider, Historical   OTHER Take 450 mg by mouth daily. \"Cranberry with vitamin C\"    Provider, Historical   OTHER Take 2,000 mg by mouth three (3) days a week.  \"Cinnamon with chromium\" Provider, Historical   FLAXSEED OIL-OMEGA 3,6,9 PO Take 2 Caps by mouth daily. Provider, Historical   docosahexanoic acid/epa (FISH OIL PO) Take 2 Caps by mouth daily. Provider, Historical   ibuprofen (MOTRIN) 200 mg tablet Take 200 mg by mouth as needed for Pain. Provider, Historical   Levothyroxine 100 mcg cap Take 100 mcg by mouth Daily (before breakfast). Provider, Historical   rosuvastatin (CRESTOR) 5 mg tablet Take 5 mg by mouth three (3) days a week. Provider, Historical         Review of Systems:  (bold if positive, if negative)    Gen:  Eyes:  ENT:  CVS:  dizzinessPulm:  GI:  nausea  :    MS:  PainSkin:  Endo:    Hem:  Renal:    Neuro:            Objective:      VITALS:    Vital signs reviewed; most recent are:    Visit Vitals  /81   Pulse 82   Temp 97.6 °F (36.4 °C)   Resp 16   Ht 5' 3\" (1.6 m)   Wt 103.5 kg (228 lb 2.8 oz)   SpO2 94%   BMI 40.42 kg/m²     SpO2 Readings from Last 6 Encounters:   11/20/18 94%   11/05/18 97%   01/24/12 96%    O2 Flow Rate (L/min): 2 l/min       Intake/Output Summary (Last 24 hours) at 11/20/2018 1627  Last data filed at 11/20/2018 1359  Gross per 24 hour   Intake 2800 ml   Output 150 ml   Net 2650 ml            Exam:     Physical Exam:    Gen:  Well-developed, well-nourished, in no acute distress  HEENT:  Pink conjunctivae, PERRL, hearing intact to voice, moist mucous membranes  Resp:  No accessory muscle use, clear breath sounds without wheezes rales or rhonchi  Card:  No murmurs, normal S1, S2 without thrills or peripheral edema  Abd:  Soft, non-tender, non-distended, normoactive bowel sounds are present  Skin:  No rashes, skin turgor is good  Neuro:  Cranial nerves 3-12 are grossly intact,  strength is 5/5 bilaterally, dorsi / plantarflexion strength is 5/5 bilaterally, follows commands appropriately  Psych:  Alert with good insight. Oriented to person, place, and time        Reviewed prior medical records in preparation for this consult:  Old medical records.     Surrogate decision maker:  daughter    Total time spent in care of this patient: 80 [phone] [address] discussed with: Patient    Discussed:  Care Plan           ___________________________________________________    Attending Physician: Rupali Walden MD

## 2018-11-20 NOTE — ANESTHESIA PROCEDURE NOTES
Peripheral Block Start time: 11/20/2018 7:55 AM 
End time: 11/20/2018 8:01 AM 
Performed by: Kelby Beard CRNA Authorized by: Gi Johnson MD  
 
 
Pre-procedure: Indications: at surgeon's request and post-op pain management Preanesthetic Checklist: patient identified, risks and benefits discussed, site marked, timeout performed, anesthesia consent given and patient being monitored Timeout Time: 07:55 Block Type:  
Block Type: Adductor canal 
Laterality:  Left Monitoring:  Continuous pulse ox, frequent vital sign checks, heart rate and responsive to questions Injection Technique:  Single shot Procedures: ultrasound guided Patient Position: supine Prep: chlorhexidine Location:  Mid thigh Needle Type:  Stimuplex Needle Gauge:  21 G Needle Localization:  Ultrasound guidance Assessment: 
Number of attempts:  1 Injection Assessment:  Incremental injection every 5 mL, local visualized surrounding nerve on ultrasound, negative aspiration for blood, no paresthesia and no intravascular symptoms Patient tolerance:  Patient tolerated the procedure well with no immediate complications

## 2018-11-20 NOTE — BRIEF OP NOTE
BRIEF OPERATIVE NOTE    Date of Procedure: 11/20/2018   Preoperative Diagnosis: LEFT KNEE OSTEO ARTHRITIS  Postoperative Diagnosis: LEFT KNEE OSTEO ARTHRITIS    Procedure(s):  LEFT TOTAL KNEE ARTHROPLASTY  Surgeon(s) and Role:     Fritz High MD - Primary         Surgical Assistant: Mary Jo Bustillo    Surgical Staff:  Circ-1: Marilyn Keller RN  Scrub Tech-1: Roxane Rodriguez  Surg Asst-1: Ventura Knapp  Surg Asst-2: Anam Yeh RN  Event Time In Time Out   Incision Start 2098    Incision Close       Anesthesia: Spinal   Estimated Blood Loss: 100cc  Specimens: * No specimens in log *   Findings: LEFT KNEE OSTEO ARTHRITIS    Complications: none  Implants:   Implant Name Type Inv.  Item Serial No.  Lot No. LRB No. Used Action   CEMENT [employer] G-HV 40/20GM -- [id number] - SNA [employer]ement [employer] G-HV 40/20GM -- [id number] [facility] [id number] Left 1 Implanted          1 Implanted

## 2018-11-20 NOTE — PERIOP NOTES
TRANSFER - OUT REPORT:    Verbal report given to  [facility] RN (name) on Humera Laws  being transferred to  Room 412 (unit) for routine progression of care       Report consisted of patients Situation, Background, Assessment and   Recommendations(SBAR). Information from the following report(s) SBAR was reviewed with the receiving nurse. Lines:   Peripheral IV Anterior; Inferior; Lower;Right Arm (Active)        Opportunity for questions and clarification was provided.       Patient transported with:   Registered Nurse update report at bedside Telephone report earlier

## 2018-11-20 NOTE — ANESTHESIA POSTPROCEDURE EVALUATION
Procedure(s): LEFT TOTAL KNEE ARTHROPLASTY. Anesthesia Post Evaluation Multimodal analgesia: multimodal analgesia not used between 6 hours prior to anesthesia start to PACU discharge Patient location during evaluation: PACU Patient participation: complete - patient participated Level of consciousness: awake Pain management: adequate Airway patency: patent Anesthetic complications: no 
Cardiovascular status: acceptable, blood pressure returned to baseline and hemodynamically stable Respiratory status: acceptable Hydration status: acceptable Visit Vitals /82 Pulse 80 Temp 36.3 °C (97.4 °F) Resp 14 Ht 5' 3\" (1.6 m) Wt 103.5 kg (228 lb 2.8 oz) SpO2 96% BMI 40.42 kg/m²

## 2018-11-20 NOTE — PROGRESS NOTES
[facility] Pharmacy Note: 11/20/18    This patients herbal medication: Cinnamon and Cranberry has been discontinued during the hospitalization per hospital policy. If deemed medically necessary, the medication can be reordered by the prescriber. The patient will then be asked to bring in their own supply of medication. Thank you,  Channing Morgan, Pharm. D.

## 2018-11-20 NOTE — PROGRESS NOTES
Problem: Mobility Impaired (Adult and Pediatric)  Goal: *Acute Goals and Plan of Care (Insert Text)  Physical Therapy Goals  Initiated 11/20/2018    1. Patient will move from supine to sit and sit to supine  in bed with independence within 4 days. 2. Patient will perform sit to stand with modified independence within 4 days. 3. Patient will ambulate with modified independence for 100 feet with the least restrictive device within 4 days. 4. Patient will ascend/descend 4  stairs with 1 handrail(s) with independence within 4 days. 5. Patient will perform home exercise program per protocol with independence within 4 days. 6. Patient will demonstrate AROM 0-90 degrees in operative joint within 4 days. physical Therapy knee EVALUATION  Patient: Giovanny Davalos (45 y.o. female)  Date: 11/20/2018  Primary Diagnosis: LEFT KNEE OSTEO ARTHRITIS  Arthritis of knee, left  Procedure(s) (LRB):  LEFT TOTAL KNEE ARTHROPLASTY (Left) Day of Surgery   Precautions:   Fall, WBAT(limb alert LUE)    ASSESSMENT :  Based on the objective data described below, the patient presents with decreased ROM and strength to LLE, decreased transfers and functional ambulation following admission for left TKR. She was able to do bed exercises and ambulate 3 feet with rolling walker +2 min assist. BP a bit high and can be seen in doc flow sheets. Patient had right knee done in April 2018 with good results. She has all needed DME. Lives with her daughter. She will benefit from [address] or OPPT. Wendy Olguin Patient will benefit from skilled intervention to address the above impairments.   Patients rehabilitation potential is considered to be Good  Factors which may influence rehabilitation potential include:   [x]         None noted  []         Mental ability/status  []         Medical condition  []         Home/family situation and support systems  []         Safety awareness  []         Pain tolerance/management  []         Other:      PLAN :  Recommendations and Planned Interventions:  [x]           Bed Mobility Training             []    Neuromuscular Re-Education  [x]           Transfer Training                   []    Orthotic/Prosthetic Training  [x]           Gait Training                         []    Modalities  [x]           Therapeutic Exercises           []    Edema Management/Control  []           Therapeutic Activities            []    Patient and Family Training/Education  []           Other (comment):    Frequency/Duration: Patient will be followed by physical therapy twice daily to address goals. Discharge Recommendations: Home Health versus Outpatient  Further Equipment Recommendations for Discharge: none     SUBJECTIVE:   Patient stated I would like to try.     OBJECTIVE DATA SUMMARY:   HISTORY:    Past Medical History:   Diagnosis Date    Arthritis     Breast cancer, left breast ([facility] [address] 1/23/2012    Diverticulosis     GERD (gastroesophageal reflux disease)     Hyperlipemia     Hypertension     Hypothyroidism     Nausea & vomiting     Obesity (BMI 30-39.9) 11/05/2018    BMI= 39.9    Phlebitis     Varicose veins of both lower extremities      Past Surgical History:   Procedure Laterality Date    HX APPENDECTOMY  1966    HX BREAST LUMPECTOMY Left 2012    also had lymph removed    HX COLONOSCOPY  2018    HX ORTHOPAEDIC Left 2002    L BUNIONECTOMY/HAMMERTOES    HX ORTHOPAEDIC  2010    repair hammertoes    HX ORTHOPAEDIC  2000    remove corns on both pinky toes    HX OTHER SURGICAL Bilateral     vein stripping    HX TUBAL LIGATION  1978     Prior Level of Function/Home Situation: independent  Personal factors and/or comorbidities impacting plan of care: none    Home Situation  Home Environment: Private residence  Wheelchair Ramp: Yes  One/Two Story Residence: One story  Living Alone: No  Support Systems: Family member(s)  Patient Expects to be Discharged to[de-identified] Private residence  Current DME Used/Available at Home: Lizabeth Harris rolling, Cane, straight, Commode, bedside, Shower chair    EXAMINATION/PRESENTATION/DECISION MAKING:   Critical Behavior:  Neurologic State: Alert  Orientation Level: Oriented X4     Safety/Judgement: Good awareness of safety precautions  Hearing: Auditory  Auditory Impairment: None  Skin:  Not fully observed  Range Of Motion:           LLE AROM  L Knee Flexion: 75  L Knee Extension: 15              Strength:    Strength: Within functional limits(less to LLE due to surgery)                    Tone & Sensation:   Tone: Normal              Sensation: Intact                         Functional Mobility:  Bed Mobility:     Supine to Sit: Modified independent  Sit to Supine: Contact guard assistance     Transfers:  Sit to Stand: Minimum assistance;Assist x2  Stand to Sit: Minimum assistance;Assist x2                       Balance:   Sitting: Intact  Standing: Intact; With support  Ambulation/Gait Training:  Distance (ft): 3 Feet (ft)  Assistive Device: Gait belt;Walker, rolling  Ambulation - Level of Assistance: Minimal assistance;Assist x2        Gait Abnormalities: Step to gait     Left Side Weight Bearing: As tolerated                                               Therapeutic Exercises: Ankle pumps, quad sets, heel slide, SLR    Functional Measure:  Barthel Index:    Bathin  Bladder: 10  Bowels: 10  Groomin  Dressin  Feeding: 10  Mobility: 0  Stairs: 0  Toilet Use: 5  Transfer (Bed to Chair and Back): 5  Total: 50       Barthel and G-code impairment scale:  Percentage of impairment CH  0% CI  1-19% CJ  20-39% CK  40-59% CL  60-79% CM  80-99% CN  100%   Barthel Score 0-100 100 99-80 79-60 59-40 20-39 1-19   0   Barthel Score 0-20 20 17-19 13-16 9-12 5-8 1-4 0      The Barthel ADL Index: Guidelines  1. The index should be used as a record of what a patient does, not as a record of what a patient could do.   2. The main aim is to establish degree of independence from any help, physical or verbal, however minor and for whatever reason. 3. The need for supervision renders the patient not independent. 4. A patient's performance should be established using the best available evidence. Asking the patient, friends/relatives and nurses are the usual sources, but direct observation and common sense are also important. However direct testing is not needed. 5. Usually the patient's performance over the preceding 24-48 hours is important, but occasionally longer periods will be relevant. 6. Middle categories imply that the patient supplies over 50 per cent of the effort. 7. Use of aids to be independent is allowed. Tamiko Luevano., Barthel, D.W. (0107). Functional evaluation: the Barthel Index. [facility] (14)2. Linh Rodríguez mega ADOLFO Moore, Keyla Stone., Vesta Tejeda., Gareth Marcano, [address] [facility] (1999). Measuring the change indisability after inpatient rehabilitation; comparison of the responsiveness of the Barthel Index and Functional Assumption Measure. Journal of Neurology, Neurosurgery, and Psychiatry, 66(4), 008-827. Leonel Chen, N.J.A, ANTOINE Alejandro, & Sadaf Kwok, M.A. (2004.) Assessment of post-stroke quality of life in cost-effectiveness studies: The usefulness of the Barthel Index and the EuroQoL-5D.  Quality of Life Research, 15, 786-50            Physical Therapy Evaluation Charge Determination   History Examination Presentation Decision-Making   LOW Complexity : Zero comorbidities / personal factors that will impact the outcome / POC LOW Complexity : 1-2 Standardized tests and measures addressing body structure, function, activity limitation and / or participation in recreation  LOW Complexity : Stable, uncomplicated  Other outcome measures Barthel  LOW       Based on the above components, the patient evaluation is determined to be of the following complexity level: LOW     Pain:  Pain Scale 1: Numeric (0 - 10)  Pain Intensity 1: 5  Pain Location 1: Knee  Pain Orientation 1: Anterior  Activity Tolerance: good  Please refer to the flowsheet for vital signs taken during this treatment. After treatment:   []         Patient left in no apparent distress sitting up in chair  [x]         Patient left in no apparent distress in bed  [x]         Call bell left within reach  [x]         Nursing notified  []         Caregiver present  [x]         Bed alarm activated    COMMUNICATION/EDUCATION:   The patients plan of care was discussed with: Registered Nurse. [x]         Fall prevention education was provided and the patient/caregiver indicated understanding. []         Patient/family have participated as able in goal setting and plan of care. [x]         Patient/family agree to work toward stated goals and plan of care. []         Patient understands intent and goals of therapy, but is neutral about his/her participation. []         Patient is unable to participate in goal setting and plan of care.     Thank you for this referral.  Praveen Cummins, PT   Time Calculation: 30 mins

## 2018-11-20 NOTE — OP NOTES
[address] REPORT    Name:ROGER RUSS  MR#: [id number]  : 1945  ACCOUNT #: [de-identified]   DATE OF SERVICE: 2018    PREOPERATIVE DIAGNOSIS:  Left knee osteoarthritis. POSTOPERATIVE DIAGNOSIS:  Left knee osteoarthritis. PROCEDURE PERFORMED:  Left total knee replacement. SURGEON:  MD Betzy Vazquez    ANESTHESIA:  Spinal plus regional block. COMPLICATIONS:  None. ESTIMATED BLOOD LOSS:  100 mL. SPECIMENS REMOVED:  None. DRAINS:  Álvarez. IMPLANTS:  DonJoy. DESCRIPTION OF PROCEDURE:  The patient brought to the operating room and placed in a supine position, soft roll and tourniquet applied to the left thigh, but not used during the procedure. Prepped and draped in a sterile fashion with ChloraPrep. Ioban drape was used. Midline incision and medial parapatellar arthrotomy was performed. Care was taken not to elevate any of the medial soft tissue structures due to valgus alignment. The [facility] computer navigation was used for distal femoral and proximal tibial cuts set at 2 degrees of varus in the coronal plane with 11 mm distal femoral resection, 2 mm onto the most affected lateral tibial plateau, 5 degrees of flexion and posterior slope respectively. The extension gap was measured at 19 mm. This was matched in flexion. The anterior, posterior and chamfer cuts were made for a size 6. The tibia was measured to a 7. The trial components were inserted with a 19 mm polyethylene. There was good range of motion, 2+ opening with valgus stress, neutral extension. The patella was resected from 25 mm to 15 mm and sized to a 32. There was good patellar tracking. The trial components were removed. The proximal tibia was prepared. The joint was irrigated with Pulsavac lavage. There was good hemostasis. The Donjoy press-fit size 7 tibia was inserted with a 19 mm polyethylene that was secured in place.   The size 6 femoral component was inserted. The knee was taken through range of motion, there was good stability with 2+ opening with valgus stress, good alignment, neutral extension. The patellar button was applied with cement and a clamp. Excess cement was removed. Once the cement cured, knee range of motion revealed good patellar tracking. The joint again was irrigated with Pulsavac lavage. There was good hemostasis. The extensor mechanism closed with #2 Stratafix sutures, subcutaneous 2-0 Vicryl and a running 3-0 Monocryl subcuticular suture. Sterile dressing applied. Patient awakened, returned to recovery room in stable condition after insertion of a Álvarez in a sterile fashion. Her family notified of her condition.       MD MARIN Gonzalez / ELIAS  D: 11/20/2018 10:27     T: 11/20/2018 17:03  JOB #: [id number]

## 2018-11-21 LAB
ANION GAP SERPL CALC-SCNC: 8 MMOL/L (ref 5–15)
BUN SERPL-MCNC: 10 MG/DL (ref 6–20)
BUN/CREAT SERPL: 18 (ref 12–20)
CALCIUM SERPL-MCNC: 8.9 MG/DL (ref 8.5–10.1)
CHLORIDE SERPL-SCNC: 105 MMOL/L (ref 97–108)
CO2 SERPL-SCNC: 26 MMOL/L (ref 21–32)
CREAT SERPL-MCNC: 0.57 MG/DL (ref 0.55–1.02)
GLUCOSE SERPL-MCNC: 132 MG/DL (ref 65–100)
HGB BLD-MCNC: 10.9 G/DL (ref 11.5–16)
POTASSIUM SERPL-SCNC: 3.8 MMOL/L (ref 3.5–5.1)
SODIUM SERPL-SCNC: 139 MMOL/L (ref 136–145)

## 2018-11-21 PROCEDURE — 74011250636 HC RX REV CODE- 250/636: Performed by: ORTHOPAEDIC SURGERY

## 2018-11-21 PROCEDURE — 97165 OT EVAL LOW COMPLEX 30 MIN: CPT | Performed by: OCCUPATIONAL THERAPIST

## 2018-11-21 PROCEDURE — 74011250637 HC RX REV CODE- 250/637: Performed by: ORTHOPAEDIC SURGERY

## 2018-11-21 PROCEDURE — 65270000029 HC RM PRIVATE

## 2018-11-21 PROCEDURE — 74011250636 HC RX REV CODE- 250/636: Performed by: INTERNAL MEDICINE

## 2018-11-21 PROCEDURE — 94762 N-INVAS EAR/PLS OXIMTRY CONT: CPT

## 2018-11-21 PROCEDURE — 74011000250 HC RX REV CODE- 250: Performed by: ORTHOPAEDIC SURGERY

## 2018-11-21 PROCEDURE — 97110 THERAPEUTIC EXERCISES: CPT

## 2018-11-21 PROCEDURE — 97535 SELF CARE MNGMENT TRAINING: CPT | Performed by: OCCUPATIONAL THERAPIST

## 2018-11-21 PROCEDURE — 36415 COLL VENOUS BLD VENIPUNCTURE: CPT

## 2018-11-21 PROCEDURE — 85018 HEMOGLOBIN: CPT

## 2018-11-21 PROCEDURE — 74011250637 HC RX REV CODE- 250/637: Performed by: INTERNAL MEDICINE

## 2018-11-21 PROCEDURE — 97116 GAIT TRAINING THERAPY: CPT

## 2018-11-21 PROCEDURE — 80048 BASIC METABOLIC PNL TOTAL CA: CPT

## 2018-11-21 RX ORDER — MAG HYDROX/ALUMINUM HYD/SIMETH 200-200-20
30 SUSPENSION, ORAL (FINAL DOSE FORM) ORAL
Status: DISCONTINUED | OUTPATIENT
Start: 2018-11-21 | End: 2018-11-22 | Stop reason: HOSPADM

## 2018-11-21 RX ADMIN — PANTOPRAZOLE SODIUM 40 MG: 40 TABLET, DELAYED RELEASE ORAL at 08:34

## 2018-11-21 RX ADMIN — OXYCODONE HYDROCHLORIDE 10 MG: 5 TABLET ORAL at 08:33

## 2018-11-21 RX ADMIN — Medication 5 ML: at 06:00

## 2018-11-21 RX ADMIN — OXYBUTYNIN CHLORIDE 5 MG: 5 TABLET ORAL at 08:34

## 2018-11-21 RX ADMIN — OMEGA-3 FATTY ACIDS CAP 1000 MG 2 CAPSULE: 1000 CAP at 08:34

## 2018-11-21 RX ADMIN — ONDANSETRON 4 MG: 2 INJECTION INTRAMUSCULAR; INTRAVENOUS at 17:22

## 2018-11-21 RX ADMIN — SODIUM CHLORIDE 125 ML/HR: 900 INJECTION, SOLUTION INTRAVENOUS at 04:00

## 2018-11-21 RX ADMIN — OXYCODONE HYDROCHLORIDE 10 MG: 5 TABLET ORAL at 17:25

## 2018-11-21 RX ADMIN — ACETAMINOPHEN 1000 MG: 325 TABLET, FILM COATED ORAL at 00:16

## 2018-11-21 RX ADMIN — POLYETHYLENE GLYCOL (3350) 17 G: 17 POWDER, FOR SOLUTION ORAL at 08:33

## 2018-11-21 RX ADMIN — ACETAMINOPHEN 1000 MG: 325 TABLET, FILM COATED ORAL at 06:22

## 2018-11-21 RX ADMIN — SENNOSIDES AND DOCUSATE SODIUM 1 TABLET: 8.6; 5 TABLET ORAL at 08:35

## 2018-11-21 RX ADMIN — ASPIRIN 325 MG: 325 TABLET, DELAYED RELEASE ORAL at 17:25

## 2018-11-21 RX ADMIN — SENNOSIDES AND DOCUSATE SODIUM 1 TABLET: 8.6; 5 TABLET ORAL at 17:25

## 2018-11-21 RX ADMIN — Medication 2 G: at 08:35

## 2018-11-21 RX ADMIN — Medication 2 G: at 00:16

## 2018-11-21 RX ADMIN — HYDROMORPHONE HYDROCHLORIDE 0.5 MG: 2 INJECTION, SOLUTION INTRAMUSCULAR; INTRAVENOUS; SUBCUTANEOUS at 04:04

## 2018-11-21 RX ADMIN — ROSUVASTATIN CALCIUM 5 MG: 10 TABLET, FILM COATED ORAL at 18:42

## 2018-11-21 RX ADMIN — OXYBUTYNIN CHLORIDE 5 MG: 5 TABLET ORAL at 17:25

## 2018-11-21 RX ADMIN — Medication 10 ML: at 22:20

## 2018-11-21 RX ADMIN — OXYCODONE HYDROCHLORIDE 10 MG: 5 TABLET ORAL at 13:43

## 2018-11-21 RX ADMIN — ALUMINUM HYDROXIDE, MAGNESIUM HYDROXIDE, AND SIMETHICONE 30 ML: 200; 200; 20 SUSPENSION ORAL at 18:08

## 2018-11-21 RX ADMIN — VITAMIN D, TAB 1000IU (100/BT) 4000 UNITS: 25 TAB at 08:34

## 2018-11-21 RX ADMIN — OXYCODONE HYDROCHLORIDE 10 MG: 5 TABLET ORAL at 22:21

## 2018-11-21 RX ADMIN — ACETAMINOPHEN 1000 MG: 325 TABLET, FILM COATED ORAL at 13:43

## 2018-11-21 RX ADMIN — ASPIRIN 325 MG: 325 TABLET, DELAYED RELEASE ORAL at 08:34

## 2018-11-21 RX ADMIN — THERA TABS 1 TABLET: TAB at 08:34

## 2018-11-21 RX ADMIN — Medication 10 ML: at 13:45

## 2018-11-21 RX ADMIN — LEVOTHYROXINE SODIUM 100 MCG: 100 TABLET ORAL at 07:30

## 2018-11-21 NOTE — PROGRESS NOTES
Problem: Mobility Impaired (Adult and Pediatric)  Goal: *Acute Goals and Plan of Care (Insert Text)  Physical Therapy Goals  Initiated 11/20/2018    1. Patient will move from supine to sit and sit to supine  in bed with independence within 4 days. 2. Patient will perform sit to stand with modified independence within 4 days. 3. Patient will ambulate with modified independence for 100 feet with the least restrictive device within 4 days. 4. Patient will ascend/descend 4  stairs with 1 handrail(s) with independence within 4 days. 5. Patient will perform home exercise program per protocol with independence within 4 days. 6. Patient will demonstrate AROM 0-90 degrees in operative joint within 4 days. physical Therapy TREATMENT  Patient: Sumi Mora (73 y.o. female)  Date: 11/21/2018  Diagnosis: LEFT KNEE OSTEO ARTHRITIS  Arthritis of knee, left Arthritis of knee, left  Procedure(s) (LRB):  LEFT TOTAL KNEE ARTHROPLASTY (Left) 1 Day Post-Op  Precautions: Fall, WBAT(limb alert LUE)  Chart, physical therapy assessment, plan of care and goals were reviewed. ASSESSMENT:  Patient able to increase ambulation distance to 150 feet with RW, CGA. No loss of balance or instability. Patient progressing with ROM. She is overall MOD I for bed mobility and Supervision for transfers. She does not have stairs, only a ramp to enter the home. Anticipate discharge tomorrow. Progression toward goals:  [x]      Improving appropriately and progressing toward goals  []      Improving slowly and progressing toward goals  []      Not making progress toward goals and plan of care will be adjusted     PLAN:  Patient continues to benefit from skilled intervention to address the above impairments. Continue treatment per established plan of care.   Discharge Recommendations:  Home Health  Further Equipment Recommendations for Discharge:  TBD     SUBJECTIVE:   \"the doctor said he wanted me to stay and get more therapy\"    OBJECTIVE DATA SUMMARY:   Range of Motion:                    LLE AROM  L Knee Flexion: 80  L Knee Extension: 10     Functional Mobility Training:  Bed Mobility:  Rolling: Modified independent  Supine to Sit: Modified independent  Sit to Supine: Supervision           Transfers:  Sit to Stand: Supervision  Stand to Sit: Supervision        Bed to Chair: Supervision                    Ambulation/Gait Training:  Distance (ft): 150 Feet (ft)  Assistive Device: Walker, rolling;Gait belt  Ambulation - Level of Assistance: Contact guard assistance                       Speed/Brianna: Slow                       Stairs: Therapeutic Exercises:   Exercises performed per protocol. See morning treatment note for description. Pain:  Pain Scale 1: Numeric (0 - 10)  Pain Intensity 1: 4  Pain Location 1: Leg        Pain Intervention(s) 1: Medication (see MAR)  Activity Tolerance:   No complications with upright activity  Please refer to the flowsheet for vital signs taken during this treatment.   After treatment:   [x] Patient left in no apparent distress sitting up in chair  [] Patient left in no apparent distress in bed  [x] Call bell left within reach  [x] Nursing notified  [] Caregiver present  [x] Chair alarm activated    COMMUNICATION/COLLABORATION:   The patients plan of care was discussed with: Registered Nurse    Mike Duomnt, PT, DPT   Time Calculation: 21 mins

## 2018-11-21 NOTE — PROGRESS NOTES
Problem: Mobility Impaired (Adult and Pediatric)  Goal: *Acute Goals and Plan of Care (Insert Text)  Physical Therapy Goals  Initiated 11/20/2018    1. Patient will move from supine to sit and sit to supine  in bed with independence within 4 days. 2. Patient will perform sit to stand with modified independence within 4 days. 3. Patient will ambulate with modified independence for 100 feet with the least restrictive device within 4 days. 4. Patient will ascend/descend 4  stairs with 1 handrail(s) with independence within 4 days. 5. Patient will perform home exercise program per protocol with independence within 4 days. 6. Patient will demonstrate AROM 0-90 degrees in operative joint within 4 days. physical Therapy TREATMENT  Patient: Yun Wagoner (80 y.o. female)  Date: 11/21/2018  Diagnosis: LEFT KNEE OSTEO ARTHRITIS  Arthritis of knee, left Arthritis of knee, left  Procedure(s) (LRB):  LEFT TOTAL KNEE ARTHROPLASTY (Left) 1 Day Post-Op  Precautions: Fall, WBAT(limb alert LUE)  Chart, physical therapy assessment, plan of care and goals were reviewed. ASSESSMENT:  Patient able to increase ambulation distance to 50 feet with 1 person assist.  No knee buckling or instability. Performed LE exercises prior to mobilzing. Patient is very motivated and self-directed with progression of LE exercises. She was returned to sitting up in chair. Patient vitals were obtained, blood pressure is elevated post activity. Nursing was notified. Patient is progressing well, patient has no steps to enter the home. Will practice a curb like step with this afternoon session. Progression toward goals:  [x]      Improving appropriately and progressing toward goals  []      Improving slowly and progressing toward goals  []      Not making progress toward goals and plan of care will be adjusted     PLAN:  Patient continues to benefit from skilled intervention to address the above impairments.   Continue treatment per established plan of care. Discharge Recommendations:  Per MD recommendation  Further Equipment Recommendations for Discharge: Owns RW     SUBJECTIVE:   Patient stated .    OBJECTIVE DATA SUMMARY:   Critical Behavior:  Neurologic State: Alert, Appropriate for age  Orientation Level: Oriented X4  Cognition: Appropriate for age attention/concentration, Appropriate decision making, Appropriate safety awareness, Follows commands  Safety/Judgement: Awareness of environment, Driving appropriateness, Fall prevention, Good awareness of safety precautions, Home safety, Insight into deficits  Range of Motion:                     Vitals  Vitals:    11/21/18 0351 11/21/18 0738 11/21/18 1031 11/21/18 1035   BP: 144/80 122/67 140/70 180/90   BP 1 Location: Right arm Right arm Right arm Right arm   BP Patient Position: At rest At rest At rest;Supine Post activity   Pulse: 100 85 70 95   Resp: 15 16     Temp: 97.8 °F (36.6 °C) 97.7 °F (36.5 °C)     SpO2: 94% 94%     Weight:       Height:              Functional Mobility Training:  Bed Mobility:  Rolling: Supervision  Supine to Sit: Supervision  Sit to Supine: Supervision           Transfers:  Sit to Stand: Contact guard assistance  Stand to Sit: Supervision        Bed to Chair: Contact guard assistance                    Balance:  Sitting: Intact  Standing: Intact; With support(RW)  Ambulation/Gait Training:  Distance (ft): 50 Feet (ft)  Assistive Device: Walker, rolling;Gait belt  Ambulation - Level of Assistance: Contact guard assistance                       Speed/Brianna: Slow                       Stairs:            Therapeutic Exercises:     EXERCISE   Sets   Reps   Active Active Assist   Passive Self ROM   Comments   Ankle Pumps 2 10 [x]                                        []                                        []                                        []                                           Quad Sets 2 10 [x]                                        [] []                                        []                                           Hamstring Sets 2 10 [x]                                        []                                        []                                        []                                           Short Arc Quads   []                                        []                                        []                                        []                                           Knee Extension Stretch     []                                          []                                          []                                          []                                           Heel Slides 2 10 [x]                                        []                                        []                                        []                                           Long Arc Quads   []                                        []                                        []                                        []                                           Knee Flexion Stretch   []                                        []                                        []                                        []                                           Straight Leg Raises   []                                        []                                        []                                        []                                             Pain:  Pain Scale 1: Numeric (0 - 10)  Pain Intensity 1: 4  Pain Location 1: Leg        Pain Intervention(s) 1: Medication (see MAR)  Activity Tolerance:   No complications with upright activity  Please refer to the flowsheet for vital signs taken during this treatment.   After treatment:   [x] Patient left in no apparent distress sitting up in chair  [] Patient left in no apparent distress in bed  [x] Call bell left within reach  [x] Nursing notified  [] Caregiver present  [x] Chair alarm activated    COMMUNICATION/COLLABORATION:   The patients plan of care was discussed with: Registered Nurse    Janelle Garnica PT, DPT   Time Calculation: 25 mins

## 2018-11-21 NOTE — PROGRESS NOTES
11/21/2018  5:10 PM  CM completed referral to [facility]. Awaiting response. Pt owns a RW.

## 2018-11-21 NOTE — PROGRESS NOTES
Ortho Progress Note     Patient: Bossman Chavis MRN: [id number]  SSN: [id number]    YOB: 1945  Age: 71 y.o. Sex: female      POD:    1 Day Post-Op  S/P:    Procedure(s):  LEFT TOTAL KNEE ARTHROPLASTY     Subjective:   Bossman Chavis is a 71 y.o. female who is resting in bed with an appropriate level of post-operative knee pain. Patient is 1 Day Post-Op s/p Procedure(s):  LEFT TOTAL KNEE ARTHROPLASTY     Objective  Objective:     Patient Vitals for the past 12 hrs:   BP Temp Pulse Resp SpO2   11/21/18 1056 127/72 98.4 °F (36.9 °C) 88 17 95 %   11/21/18 1035 180/90 -- 95 -- --   11/21/18 1031 140/70 -- 70 -- --   11/21/18 0738 122/67 97.7 °F (36.5 °C) 85 16 94 %   11/21/18 0351 144/80 97.8 °F (36.6 °C) 100 15 94 %     Recent Labs     11/21/18  0629   HGB 10.9*      K 3.8      CO2 26   BUN 10   CREA 0.57   *       Alert and oriented x3. The operative knee dressing is clean/dry/intact. Bilateral calves are soft and nontender with a negative Homans sign. Dorsiflexion and plantar flexion are intact. The patient is neurovascularly intact.     Gait  Speed/Brianna: Slow  Gait Abnormalities: Step to gait  Ambulation - Level of Assistance: Contact guard assistance  Distance (ft): 50 Feet (ft)  Assistive Device: Walker, rolling, Gait belt       Patient mobility  Bed Mobility Training  Rolling: Supervision  Supine to Sit: Supervision  Sit to Supine: Supervision  Transfer Training  Sit to Stand: Contact guard assistance  Stand to Sit: Supervision  Bed to Chair: Contact guard assistance      Gait Training  Assistive Device: Walker, rolling, Gait belt  Ambulation - Level of Assistance: Contact guard assistance  Distance (ft): 50 Feet (ft)   Weight Bearing Status  Left Side Weight Bearing: As tolerated         Assessment:     Assessment:     Patient Active Problem List   Diagnosis Code    [facility] C50.912    Arthritis of knee, left M17.12    Essential hypertension I10    Acquired hypothyroidism E03.9    Hyperlipemia E78.5    s/p Procedure(s):  LEFT TOTAL KNEE ARTHROPLASTY       Plan:   1. We will continue to work with formalized physical therapy and occupational therapy to improve ambulation and range of motion. 2. The patient will continue the current pain management regimen.   3. We will continue DVT prophalaxis   Geovanna Greene MD

## 2018-11-21 NOTE — PROGRESS NOTES
Occupational Therapy EVALUATION/discharge  Patient: Krys Solomon (43 y.o. female)  Date: 11/21/2018  Primary Diagnosis: LEFT KNEE OSTEO ARTHRITIS  Arthritis of knee, left  Procedure(s) (LRB):  LEFT TOTAL KNEE ARTHROPLASTY (Left) 1 Day Post-Op   Precautions:  Fall, WBAT(limb alert LUE)    ASSESSMENT:   Based on the objective data described below, the patient presents at an overall SBA and set-up level with LE ADLs, toileting and functional mobility using RW to amb. She demonstrated good safety awareness and no LOB. She will have assist at home from her daughter and has all equipment needed for safety at home. Further skilled acute occupational therapy is not indicated at this time. Discharge Recommendations: None for OT  Further Equipment Recommendations for Discharge: none for OT      SUBJECTIVE:   Patient stated I don't think I'll be walking in the halls today.     OBJECTIVE DATA SUMMARY:   HISTORY:   Past Medical History:   Diagnosis Date    Arthritis     Breast cancer, left breast ([address] 1/23/2012    Diverticulosis     GERD (gastroesophageal reflux disease)     Hyperlipemia     Hypertension     Hypothyroidism     Nausea & vomiting     Obesity (BMI 30-39.9) 11/05/2018    BMI= 39.9    Phlebitis     Varicose veins of both lower extremities      Past Surgical History:   Procedure Laterality Date    HX APPENDECTOMY  1966    HX BREAST LUMPECTOMY Left 2012    also had lymph removed    HX COLONOSCOPY  2018    HX ORTHOPAEDIC Left 2002    L BUNIONECTOMY/HAMMERTOES    HX ORTHOPAEDIC  2010    repair hammertoes    HX ORTHOPAEDIC  2000    remove corns on both pinky toes    HX OTHER SURGICAL Bilateral     vein stripping    HX TUBAL LIGATION  1978       Prior Level of Function/Environment/Context: Independent, drives  Home Situation  Home Environment: Private residence  Wheelchair Ramp: Yes  One/Two Story Residence: One story  Living Alone: No  Support Systems: Family member(s)  Patient Expects to be Discharged to[de-identified] Private residence  Current DME Used/Available at Home: eRji Lama, Bryan Laguerre, Cane, straight, Commode, bedside, Shower chair  Tub or Shower Type: Shower    Hand dominance: Right    EXAMINATION OF PERFORMANCE DEFICITS:  Cognitive/Behavioral Status:  Neurologic State: Alert; Appropriate for age  Orientation Level: Oriented X4  Cognition: Appropriate for age attention/concentration; Appropriate decision making; Appropriate safety awareness; Follows commands  Perception: Appears intact  Perseveration: No perseveration noted  Safety/Judgement: Awareness of environment;Driving appropriateness; Fall prevention;Good awareness of safety precautions; Home safety; Insight into deficits      Hearing: Auditory  Auditory Impairment: None    Vision/Perceptual:    Acuity: Within Defined Limits    Corrective Lenses: Glasses    Strength:  Strength: Generally decreased, functional                Coordination:     Fine Motor Skills-Upper: Left Intact; Right Intact    Gross Motor Skills-Upper: Left Intact; Right Intact    Tone & Sensation:  Tone: Normal  Sensation: Intact                      Balance:  Sitting: Intact  Standing: Intact; With support(RW)    Functional Mobility and Transfers for ADLs:    Transfers:  Sit to Stand: Supervision(RW)  Stand to Sit: Stand-by assistance  Bathroom Mobility: Stand-by assistance(using RW)  Toilet Transfer : Supervision    ADL Assessment:  Feeding: Independent    Oral Facial Hygiene/Grooming: Supervision(standing at sink)    Bathing: Minimum assistance(A to reach L foot)    Upper Body Dressing: Setup    Lower Body Dressing: Minimum assistance(A to reach L foot)    Toileting: Supervision                ADL Intervention and task modifications:  Cognitive Retraining  Safety/Judgement: Awareness of environment;Driving appropriateness; Fall prevention;Good awareness of safety precautions; Home safety; Insight into deficits    Bathing: Patient instructed and indicated understanding when bathing to not submerge wound in water, stand to shower or sponge bathe, cover wound with plastic and tape to ensure no water reaches bandage/wound without cues. Dressing joint: Patient instructed and demonstrated understanding to don/doff Left LE first/last with Supervision. Patient instructed and demonstrated to don all clothing while sitting prior to standing, doff all clothing to knees while standing, then sit to doff clothing off from knees to feet in order to facilitate fall prevention, pain management, and energy conservation with Supervision. Dressing joint reach exercise: To increase independence with lower body dressing, patient instructed and demonstrated to reach down Left LE in a seated position slowly to prevent tearing/shearing until slight pull is felt, hold at end range for 10 seconds, then return to starting upright position with Supervision. Patient instructed to complete three sets of three repetitions each daily. Home safety: Patient instructed and indicated understanding on home modifications and safety (raise height of ADL objects, appropriate height of chair surfaces, recliner safety, change of floor surfaces, clear pathways) to increase independence and fall prevention. Standing: Patient instructed and demonstrated during ADLs to walk up to sink/counter top/surfaces, step into walker to increase safety of joint and fall prevention with Supervision. Patient educated about knee anatomy verbally and educated to avoid rotation of Left LE. Instructed to apply concept to ADLs within the home (no twisting of knee during reaching across body, square off while using objects, slide objects along surfaces). Patient instructed and indicated understanding to increase amount of time standing, observe standing position during ADLs in order to increase even weight bearing through bilateral LEs in order to increase independence with ADLs.   Goal to be reached 30 days post - op, per orthopedic surgeon or per PT. Tub/shower transfer: Patient instructed and indicated understanding regarding when it is safe to begin transfer into tub/shower (complete stairs with PT, advance exercises with PT high enough to clear tub/shower height). Patient instructed to use the same technique as used with stairs when entering and exiting tub/shower (\"up with the non-surgical, down with the surgical leg\"). Functional Measure:  Barthel Index:    Bathin  Bladder: 10  Bowels: 10  Groomin  Dressin  Feeding: 10  Mobility: 10  Stairs: 0  Toilet Use: 5  Transfer (Bed to Chair and Back): 10  Total: 65       Barthel and G-code impairment scale:  Percentage of impairment CH  0% CI  1-19% CJ  20-39% CK  40-59% CL  60-79% CM  80-99% CN  100%   Barthel Score 0-100 100 99-80 79-60 59-40 20-39 1-19   0   Barthel Score 0-20 20 17-19 13-16 9-12 5-8 1-4 0      The Barthel ADL Index: Guidelines  1. The index should be used as a record of what a patient does, not as a record of what a patient could do. 2. The main aim is to establish degree of independence from any help, physical or verbal, however minor and for whatever reason. 3. The need for supervision renders the patient not independent. 4. A patient's performance should be established using the best available evidence. Asking the patient, friends/relatives and nurses are the usual sources, but direct observation and common sense are also important. However direct testing is not needed. 5. Usually the patient's performance over the preceding 24-48 hours is important, but occasionally longer periods will be relevant. 6. Middle categories imply that the patient supplies over 50 per cent of the effort. 7. Use of aids to be independent is allowed. Joshua Chong., Barthel, D.W. (7763). Functional evaluation: the Barthel Index. [address] [facility] (14)2. ADOLFO Lion, Milly Dominguez., Dony Pineda., Michael, [address] [place] [address] ().  Measuring the change indisability after inpatient rehabilitation; comparison of the responsiveness of the Barthel Index and Functional [place] Measure. Journal of Neurology, Neurosurgery, and Psychiatry, 664), 655-019. PAULO Maciel.GEETA, ANTOINE Alejandro, & Nando Worthy M.A. (2004.) Assessment of post-stroke quality of life in cost-effectiveness studies: The usefulness of the Barthel Index and the EuroQoL-5D. Quality of Life Research, 13, 410-76       G codes: In compliance with CMSs Claims Based Outcome Reporting, the following G-code set was chosen for this patient based on their primary functional limitation being treated: The outcome measure chosen to determine the severity of the functional limitation was the Barthel Index with a score of 65/100 which was correlated with the impairment scale. ? Self Care:     - CURRENT STATUS: CJ - 20%-39% impaired, limited or restricted    - GOAL STATUS: CJ - 20%-39% impaired, limited or restricted    - D/C STATUS:  CJ - 20%-39% impaired, limited or restricted     Occupational Therapy Evaluation Charge Determination   History Examination Decision-Making   LOW Complexity : Brief history review  LOW Complexity : 1-3 performance deficits relating to physical, cognitive , or psychosocial skils that result in activity limitations and / or participation restrictions  LOW Complexity : No comorbidities that affect functional and no verbal or physical assistance needed to complete eval tasks       Based on the above components, the patient evaluation is determined to be of the following complexity level: LOW   Pain:  Pain Scale 1: Numeric (0 - 10)  Pain Intensity 1: 4  Pain Location 1: Leg        Pain Intervention(s) 1: Medication (see MAR)  Activity Tolerance:   Fair  Please refer to the flowsheet for vital signs taken during this treatment.   After treatment:   []  Patient left in no apparent distress sitting up in chair  [x]  Patient left in no apparent distress in bed  [x]  Call bell left within reach  [x]  Nursing notified  []  Caregiver present  []  Bed alarm activated    COMMUNICATION/EDUCATION:   Communication/Collaboration:  [x]      Home safety education was provided and the patient/caregiver indicated understanding. [x]      Patient/family have participated as able and agree with findings and recommendations. []      Patient is unable to participate in plan of care at this time.   Findings and recommendations were discussed with: Registered Nurse    Memory Pickup, OT  Time Calculation: 27 mins

## 2018-11-22 VITALS
DIASTOLIC BLOOD PRESSURE: 65 MMHG | BODY MASS INDEX: 40.43 KG/M2 | TEMPERATURE: 98.7 F | WEIGHT: 228.18 LBS | OXYGEN SATURATION: 93 % | HEIGHT: 63 IN | HEART RATE: 95 BPM | RESPIRATION RATE: 16 BRPM | SYSTOLIC BLOOD PRESSURE: 127 MMHG

## 2018-11-22 PROCEDURE — 74011000250 HC RX REV CODE- 250: Performed by: ORTHOPAEDIC SURGERY

## 2018-11-22 PROCEDURE — 74011250636 HC RX REV CODE- 250/636: Performed by: INTERNAL MEDICINE

## 2018-11-22 PROCEDURE — 97116 GAIT TRAINING THERAPY: CPT

## 2018-11-22 PROCEDURE — 74011250637 HC RX REV CODE- 250/637: Performed by: ORTHOPAEDIC SURGERY

## 2018-11-22 PROCEDURE — 97110 THERAPEUTIC EXERCISES: CPT

## 2018-11-22 RX ORDER — ASPIRIN 325 MG
325 TABLET, DELAYED RELEASE (ENTERIC COATED) ORAL 2 TIMES DAILY
Qty: 30 TAB | Refills: 0 | Status: SHIPPED | OUTPATIENT
Start: 2018-11-22

## 2018-11-22 RX ORDER — AMOXICILLIN 250 MG
1 CAPSULE ORAL 2 TIMES DAILY
Qty: 60 TAB | Refills: 0 | Status: SHIPPED | OUTPATIENT
Start: 2018-11-22

## 2018-11-22 RX ORDER — ONDANSETRON 8 MG/1
8 TABLET, ORALLY DISINTEGRATING ORAL
Qty: 30 TAB | Refills: 0 | Status: SHIPPED | OUTPATIENT
Start: 2018-11-22

## 2018-11-22 RX ORDER — OXYCODONE HYDROCHLORIDE 10 MG/1
10 TABLET ORAL
Qty: 56 TAB | Refills: 0 | Status: SHIPPED | OUTPATIENT
Start: 2018-11-22

## 2018-11-22 RX ADMIN — PANTOPRAZOLE SODIUM 40 MG: 40 TABLET, DELAYED RELEASE ORAL at 09:07

## 2018-11-22 RX ADMIN — OXYCODONE HYDROCHLORIDE 10 MG: 5 TABLET ORAL at 06:29

## 2018-11-22 RX ADMIN — SENNOSIDES AND DOCUSATE SODIUM 1 TABLET: 8.6; 5 TABLET ORAL at 09:07

## 2018-11-22 RX ADMIN — ONDANSETRON 4 MG: 2 INJECTION INTRAMUSCULAR; INTRAVENOUS at 08:10

## 2018-11-22 RX ADMIN — OXYBUTYNIN CHLORIDE 5 MG: 5 TABLET ORAL at 09:06

## 2018-11-22 RX ADMIN — ASPIRIN 325 MG: 325 TABLET, DELAYED RELEASE ORAL at 09:06

## 2018-11-22 RX ADMIN — OXYCODONE HYDROCHLORIDE 10 MG: 5 TABLET ORAL at 13:45

## 2018-11-22 RX ADMIN — ACETAMINOPHEN 1000 MG: 325 TABLET, FILM COATED ORAL at 09:07

## 2018-11-22 RX ADMIN — VITAMIN D, TAB 1000IU (100/BT) 4000 UNITS: 25 TAB at 09:06

## 2018-11-22 RX ADMIN — Medication 10 ML: at 06:30

## 2018-11-22 RX ADMIN — THERA TABS 1 TABLET: TAB at 09:07

## 2018-11-22 RX ADMIN — LEVOTHYROXINE SODIUM 100 MCG: 100 TABLET ORAL at 09:14

## 2018-11-22 RX ADMIN — POLYETHYLENE GLYCOL (3350) 17 G: 17 POWDER, FOR SOLUTION ORAL at 09:07

## 2018-11-22 NOTE — DISCHARGE SUMMARY
Orthopedic Discharge Summary    Patient ID:  Chica Alvares  [id number]  female  71 y.o.  1945    Admit date: 11/20/2018    Discharge date and time: No discharge date for patient encounter. Admitting Physician: Andrew Espana MD     Discharge Physician: Andrew Espana MD    Consulting Physician(s): Treatment Team: Attending Provider: Bud Mckenzie MD; Consulting Provider: Sydney Wiggins MD; Utilization Review: Naomie Patricio RN; Care Manager: Merrick Guadarrama    Date of Surgery: 11/20/2018     Preoperative Diagnosis:  LEFT KNEE OSTEO ARTHRITIS    Postoperative Diagnosis: LEFT KNEE OSTEO ARTHRITIS    Procedure(s): Procedure(s):  LEFT TOTAL KNEE ARTHROPLASTY    Surgeon: Surgeon(s) and Role:     Darren Laura MD - Primary      Anesthesia:  Spinal    Preoperative Medical Clearance:                           HPI:  Pt is a 71 y.o. female who has a history of LEFT KNEE OSTEO ARTHRITIS  Arthritis of knee, left  with pain and limitations of activities of daily living who presents at this time for a Procedure(s):  LEFT TOTAL KNEE ARTHROPLASTY following the failure of conservative management. PMH:   Past Medical History:   Diagnosis Date    Arthritis     Breast cancer, left breast ([facility] 1/23/2012    Diverticulosis     GERD (gastroesophageal reflux disease)     Hyperlipemia     Hypertension     Hypothyroidism     Nausea & vomiting     Obesity (BMI 30-39.9) 11/05/2018    BMI= 39.9    Phlebitis     Varicose veins of both lower extremities        Medications upon admission :   Prior to Admission Medications   Prescriptions Last Dose Informant Patient Reported? Taking? FLAXSEED OIL-OMEGA 3,6,9 PO 11/12/2018  Yes No   Sig: Take 2 Caps by mouth daily. Levothyroxine 100 mcg cap 11/20/2018 at 500  Yes No   Sig: Take 100 mcg by mouth Daily (before breakfast). OTHER 11/12/2018  Yes No   Sig: Take 450 mg by mouth daily.  \"Cranberry with vitamin C\"   OTHER 11/12/2018  Yes No   Sig: Take 2,000 mg by mouth three (3) days a week. \"Cinnamon with chromium\"   acetaminophen (TYLENOL) 325 mg tablet 11/19/2018 at Unknown time  Yes Yes   Sig: Take 650 mg by mouth every four (4) hours as needed for Pain. alendronate (FOSAMAX) 70 mg tablet 11/11/2018  Yes No   Sig: Take 70 mg by mouth every seven (7) days. cholecalciferol, vitamin D3, (VITAMIN D3) 2,000 unit tab 11/12/2018  Yes No   Sig: Take 4,000 Units by mouth daily. docosahexanoic acid/epa (FISH OIL PO) 11/12/2018  Yes No   Sig: Take 2 Caps by mouth daily. hydroCHLOROthiazide (MICROZIDE) 12.5 mg capsule 11/19/2018  Yes No   Sig: Take 12.5 mg by mouth every morning. ibuprofen (MOTRIN) 200 mg tablet 11/12/2018  Yes No   Sig: Take 200 mg by mouth as needed for Pain.   multivitamin (ONE A DAY) tablet 11/12/2018  Yes No   Sig: Take 1 Tab by mouth daily. omeprazole (PRILOSEC) 20 mg capsule 11/20/2018 at 500  Yes Yes   Sig: Take 20 mg by mouth daily. oxybutynin (DITROPAN) 5 mg tablet 11/20/2018 at 500  Yes Yes   Sig: Take 5 mg by mouth two (2) times a day. rosuvastatin (CRESTOR) 5 mg tablet 11/12/2018  Yes No   Sig: Take 5 mg by mouth three (3) days a week. Facility-Administered Medications: None        Allergies: Allergies   Allergen Reactions    Sulfa (Sulfonamide Antibiotics) Other (comments)     MOTHER EXTREMELY ALLERGIC-TOLD NOT TO TAKE        Hospital Course: The patient underwent surgery. Complications:  None; patient tolerated the procedure well. Was taken to the PACU in stable condition and then transferred to the Orthopedics floor. Routine DVT Prophylaxis was given post-op    Post Op complications: none    Hemoglobin at discharge:    Lab Results   Component Value Date/Time    HGB 10.9 (L) 11/21/2018 06:29 AM    INR 1.0 11/05/2018 02:14 PM       Patient started post-op PT per routine. At the time of discharge, patient was cleared by PT.     Discharged to: Home    Discharge instructions:  -See Full Summary of discharge instructions attached  -Resume pre hospital diet            -Resume home medications   Current Discharge Medication List      START taking these medications    Details   aspirin delayed-release 325 mg tablet Take 1 Tab by mouth two (2) times a day. Qty: 30 Tab, Refills: 0      oxyCODONE IR (ROXICODONE) 10 mg tab immediate release tablet Take 1 Tab by mouth every three (3) hours as needed. Max Daily Amount: 80 mg.  Qty: 56 Tab, Refills: 0    Associated Diagnoses: Arthritis of knee, left      senna-docusate (PERICOLACE) 8.6-50 mg per tablet Take 1 Tab by mouth two (2) times a day. Qty: 60 Tab, Refills: 0      ondansetron (ZOFRAN ODT) 8 mg disintegrating tablet Take 1 Tab by mouth every eight (8) hours as needed for Nausea. Qty: 30 Tab, Refills: 0         CONTINUE these medications which have NOT CHANGED    Details   acetaminophen (TYLENOL) 325 mg tablet Take 650 mg by mouth every four (4) hours as needed for Pain. oxybutynin (DITROPAN) 5 mg tablet Take 5 mg by mouth two (2) times a day. omeprazole (PRILOSEC) 20 mg capsule Take 20 mg by mouth daily. hydroCHLOROthiazide (MICROZIDE) 12.5 mg capsule Take 12.5 mg by mouth every morning. alendronate (FOSAMAX) 70 mg tablet Take 70 mg by mouth every seven (7) days. multivitamin (ONE A DAY) tablet Take 1 Tab by mouth daily. cholecalciferol, vitamin D3, (VITAMIN D3) 2,000 unit tab Take 4,000 Units by mouth daily. !! OTHER Take 450 mg by mouth daily. \"Cranberry with vitamin C\"      !! OTHER Take 2,000 mg by mouth three (3) days a week. \"Cinnamon with chromium\"      FLAXSEED OIL-OMEGA 3,6,9 PO Take 2 Caps by mouth daily. docosahexanoic acid/epa (FISH OIL PO) Take 2 Caps by mouth daily. Levothyroxine 100 mcg cap Take 100 mcg by mouth Daily (before breakfast). rosuvastatin (CRESTOR) 5 mg tablet Take 5 mg by mouth three (3) days a week. !! - Potential duplicate medications found. Please discuss with provider.       STOP taking these medications       ibuprofen (MOTRIN) 200 mg tablet Comments:   Reason for Stopping:            per medical continuation form  -Discharge activity: Home  PT.  WBAT with Walker  -Wound Care: Keep occlusive dressing in place until follow-up  -Follow up in office 10-14 days      Signed:  Elisabeth Denson MD  11/22/2018  12:39 PM

## 2018-11-22 NOTE — PROGRESS NOTES
Bedside report given to [address] [place] [address],[address] using SBAR, MAR, and Kardex. Pt sitting up in bed watching tv.

## 2018-11-22 NOTE — PROGRESS NOTES
Copy of Discharge AVS given to patient by Charge Nurse. Discharge AVS reviewed with pt by Charge Nurse. IV removed by nurse aide. Pt has all of her belongings. Pt left unit via wheelchair. Transportation home by family.

## 2018-11-22 NOTE — PROGRESS NOTES
Received bedside report from [facility] Pt sitting up in bed crocheting. Voiced no complaints. Report per SBAR, MAR, and Kardex.

## 2018-11-22 NOTE — PROGRESS NOTES
11/22/2018  8:42 AM  CM received acceptance from [facility]. If pt is discharged, nurses, please fax [employer] to [phone]. Pt has a RW. No additional DC service needs indicated.

## 2018-11-22 NOTE — PROGRESS NOTES
Problem: Mobility Impaired (Adult and Pediatric)  Goal: *Acute Goals and Plan of Care (Insert Text)  Physical Therapy Goals  Initiated 11/20/2018    1. Patient will move from supine to sit and sit to supine  in bed with independence within 4 days. 2. Patient will perform sit to stand with modified independence within 4 days. 3. Patient will ambulate with modified independence for 100 feet with the least restrictive device within 4 days. 4. Patient will ascend/descend 4  stairs with 1 handrail(s) with independence within 4 days. 5. Patient will perform home exercise program per protocol with independence within 4 days. 6. Patient will demonstrate AROM 0-90 degrees in operative joint within 4 days. physical Therapy TREATMENT  Patient: Sumi Mora (84 y.o. female)  Date: 11/22/2018  Diagnosis: LEFT KNEE OSTEO ARTHRITIS  Arthritis of knee, left Arthritis of knee, left  Procedure(s) (LRB):  LEFT TOTAL KNEE ARTHROPLASTY (Left) 2 Days Post-Op  Precautions: Fall, WBAT(limb alert LUE)  Chart, physical therapy assessment, plan of care and goals were reviewed. ASSESSMENT:  Pt making steady progress towards goals however received nearly in tears and very anxious that her L leg was much weaker and reports she is unable to lift it from the bed. Spent extensive time educating pt that this is normal and slowly walked her through supine and sitting exercises to increase confidence. LLE is more swollen and with decreased ROM into flexion compared to yesterday but no buckling noted with gait. Pt progressed to step through pattern with step by step sequencing for safety with RW. Pt steady on her feet with no overt LOB but hesitant to push gait as far as yesterday. Pt states she was up in chair for hours yesterday. Reinforced importance of time resting with leg elevated. Returned to supine with pillow under calf, ice applied and SCDs donned. Pt requests to discharge this afternoon after seeing the doctor.  With support of daughter and HHPT this is appropriate at this time. Progression toward goals:  [x]      Improving appropriately and progressing toward goals  []      Improving slowly and progressing toward goals  []      Not making progress toward goals and plan of care will be adjusted     PLAN:  Patient continues to benefit from skilled intervention to address the above impairments. Continue treatment per established plan of care. Discharge Recommendations:  Home Health  Further Equipment Recommendations for Discharge:  None needed     SUBJECTIVE:   Patient stated I don;t know why it is so weak.     OBJECTIVE DATA SUMMARY:   Critical Behavior:  Neurologic State: Alert, Appropriate for age, Eyes open spontaneously  Orientation Level: Oriented X4  Cognition: Appropriate decision making, Appropriate for age attention/concentration, Appropriate safety awareness  Safety/Judgement: Awareness of environment, Driving appropriateness, Fall prevention, Good awareness of safety precautions, Home safety, Insight into deficits  Range of Motion:                    LLE AROM  L Knee Flexion: 72  L Knee Extension: 8     Functional Mobility Training:  Bed Mobility:  Rolling: Stand-by assistance  Supine to Sit: Stand-by assistance  Sit to Supine: Contact guard assistance           Transfers:  Sit to Stand: Contact guard assistance  Stand to Sit: Setup                             Balance:  Sitting: Intact  Standing: Intact; With support  Ambulation/Gait Training:  Distance (ft): 120 Feet (ft)  Assistive Device: Walker, rolling;Gait belt  Ambulation - Level of Assistance: Contact guard assistance        Gait Abnormalities: Decreased step clearance              Speed/Brianna: Slow                       Stairs:            Therapeutic Exercises:     EXERCISE   Sets   Reps   Active Active Assist   Passive Self ROM   Comments   Ankle Pumps 1 12 [x]                                        []                                        [] []                                           Quad Sets 1 12 [x]                                        []                                        []                                        []                                           Hamstring Sets   []                                        []                                        []                                        []                                           Short Arc Quads 1 12 [x]                                        []                                        []                                        []                                           Knee Extension Stretch     []                                          []                                          []                                          []                                           Heel Slides 1 12 []                                        [x]                                        []                                        []                                           Long Arc Quads 1 12 []                                        [x]                                        []                                        []                                           Knee Flexion Stretch   []                                        []                                        []                                        []                                           Straight Leg Raises 1 5 []                                        [x]                                        []                                        []                                             Pain:  Pain Scale 1: Numeric (0 - 10)  Pain Intensity 1: 0  Pain Location 1: Knee(left)  Pain Orientation 1: Anterior  Pain Description 1: Dull     Activity Tolerance:   Good  Please refer to the flowsheet for vital signs taken during this treatment.   After treatment:   [] Patient left in no apparent distress sitting up in chair  [x] Patient left in no apparent distress in bed  [x] Call bell left within reach  [x] Nursing notified  [] Caregiver present  [x] Bed alarm activated    COMMUNICATION/COLLABORATION:   The patients plan of care was discussed with: Registered Nurse    Mirian Parmar, PT   Time Calculation: 36 mins

## 2018-11-22 NOTE — PROGRESS NOTES
Problem: Falls - Risk of  Goal: *Absence of Falls  Document Adin Fall Risk and appropriate interventions in the flowsheet.   Fall Risk Interventions:  Mobility Interventions: OT consult for ADLs, Patient to call before getting OOB, PT Consult for mobility concerns, Utilize walker, cane, or other assistive device, Communicate number of staff needed for ambulation/transfer         Medication Interventions: Assess postural VS orthostatic hypotension, Patient to call before getting OOB, Teach patient to arise slowly, Bed/chair exit alarm    Elimination Interventions: Call light in reach, Elevated toilet seat, Patient to call for help with toileting needs, Toilet paper/wipes in reach    History of Falls Interventions: Room close to nurse's station, Bed/chair exit alarm

## 2018-11-22 NOTE — DISCHARGE INSTRUCTIONS
Knee Surgery Discharge Instructions  Dr. Mik Simmons     Activity:  You should be as active as you can tolerate within the guidelines you have been given. Perform the exercises you have been given twice daily. Use you walker or crutches as directed by your physician. You should rest for one hour twice a day with your feet elevated. Diet:  You may resume your regular home diet. Medications:    Pain:  You have been given a prescription for pain control. Take this medication as directed. Do not take more than prescribed. If your pain is not controlled by the medication you were given, please call your surgeons office. Anticoagulation:  You need to take aspirin 1 tablet twice daily for 14 days. Do not take Aleve, Ibuprofen or other anti-inflammatory medications while on aspirin unless advised by Dr. Stacy Cleary. Stool Softeners:  If it important to have regular bowel movements. Pain medications can cause constipation. Stool softeners, warm prune juice, increasing your water intake, and increasing fiber can help in preventing constipation. Do not take laxatives if at all possible except in severe situations. It can result in a vicious cycle between constipation and diarrhea. Ice Pack   Application of ice will prevent and treat inflammation. You should use this continuously during the day for the first week if you are able to do so. Afterwards, use as needed depending on the amount of swelling. Skin Care:  Dressing: Keep the plastic dressing on for 7 days then remove. Showering: You may shower any time once you are home. Keep the dressing on. Compression Stockings:  Wear your compression stockings everyday for 4 weeks, you may remove them at night, hand wash them out and let them air dry. Home Health: You will be seen by a Home Health agency after your discharge. Follow up Care:    Call to schedule your follow up appointment, if you do not already have one.     Reasons to call your surgeon:     Fever above 101 for more than 24 hours   Persistent pain in the calf or either leg   Pain uncontrolled by your pain medication   Nausea or vomiting   Redness, swelling or drainage from your incision   Numbness, tingling, or change in your affected extremity    Or any other concern                                                   Prop your unaffected knee up, and note how straight that knee will go, this is how you can monitor your progress, and work to get your surgical knee as straight.

## 2018-11-22 NOTE — PROGRESS NOTES
Pt c/o feeling tired, weakness in L leg, sore throat. HR low 100s. MD Rachel Bazan on call gor Anam Celeste) paged.        09:55 Per MD Anam Celeste order chloraseptic spray and have PT work with pt, weakness is not abnormal

## 2020-10-03 ENCOUNTER — TRANSCRIBE ORDER (OUTPATIENT)
Dept: SCHEDULING | Age: 75
End: 2020-10-03

## 2020-10-03 DIAGNOSIS — E78.5 HYPERLIPIDEMIA: Primary | ICD-10-CM

## 2020-10-05 ENCOUNTER — TRANSCRIBE ORDER (OUTPATIENT)
Dept: SCHEDULING | Age: 75
End: 2020-10-05

## 2020-10-05 DIAGNOSIS — R68.89 MECHANICAL AND MOTOR PROBLEMS WITH INTERNAL ORGANS: Primary | ICD-10-CM

## 2024-04-04 NOTE — ANESTHESIA PREPROCEDURE EVALUATION
Anesthetic History No history of anesthetic complications PONV Review of Systems / Medical History Patient summary reviewed, nursing notes reviewed and pertinent labs reviewed Pulmonary Within defined limits Neuro/Psych Within defined limits Cardiovascular Within defined limits Hypertension Exercise tolerance: >4 METS 
  
GI/Hepatic/Renal 
Within defined limits GERD Endo/Other Within defined limits Hypothyroidism Arthritis Other Findings Physical Exam 
 
Airway Mallampati: II 
 
Neck ROM: normal range of motion Mouth opening: Normal 
 
 Cardiovascular Regular rate and rhythm,  S1 and S2 normal,  no murmur, click, rub, or gallop Rhythm: regular Rate: normal 
 
 
 
 Dental 
 
Dentition: Full upper dentures and Full lower dentures Pulmonary Breath sounds clear to auscultation Abdominal 
GI exam deferred Other Findings Anesthetic Plan ASA: 2 Anesthesia type: spinal 
 
 
 
 
Induction: Intravenous Anesthetic plan and risks discussed with: Patient
None known

## (undated) DEVICE — BIPOLAR SEALER 23-112-1 AQM 6.0: Brand: AQUAMANTYS ®

## (undated) DEVICE — SUTURE STRATAFIX SPRL SZ 1 L14IN ABSRB VLT L48CM CTX 1/2 SXPD2B405

## (undated) DEVICE — STERILE POLYISOPRENE POWDER-FREE SURGICAL GLOVES: Brand: PROTEXIS

## (undated) DEVICE — SUTURE MCRYL SZ 3-0 L27IN ABSRB UD L19MM PS-2 3/8 CIR PRIM Y427H

## (undated) DEVICE — PADDING CST 6IN STERILE --

## (undated) DEVICE — Device

## (undated) DEVICE — LIGHT HANDLE: Brand: DEVON

## (undated) DEVICE — SOL INJ SOD CL 0.9% 1000ML BG --

## (undated) DEVICE — DRAPE,REIN 53X77,STERILE: Brand: MEDLINE

## (undated) DEVICE — 3M™ IOBAN™ 2 ANTIMICROBIAL INCISE DRAPE 6648EZ: Brand: IOBAN™ 2

## (undated) DEVICE — DEVON™ KNEE AND BODY STRAP 60" X 3" (1.5 M X 7.6 CM): Brand: DEVON

## (undated) DEVICE — INTENDED FOR TISSUE SEPARATION, AND OTHER PROCEDURES THAT REQUIRE A SHARP SURGICAL BLADE TO PUNCTURE OR CUT.: Brand: BARD-PARKER ® CARBON RIB-BACK BLADES

## (undated) DEVICE — DUAL CUT SAGITTAL BLADE

## (undated) DEVICE — HANDPIECE SET WITH COAXIAL HIGH FLOW TIP AND SUCTION TUBE: Brand: INTERPULSE

## (undated) DEVICE — INFECTION CONTROL KIT SYS

## (undated) DEVICE — SOLUTION IRRIG 3000ML 0.9% SOD CHL FLX CONT 0797208] ICU MEDICAL INC]

## (undated) DEVICE — APPLICATOR BNDG 1MM ADH PREMIERPRO EXOFIN

## (undated) DEVICE — ZIMMER® STERILE DISPOSABLE TOURNIQUET CUFF WITH PROTECTIVE SLEEVE AND PLC, DUAL PORT, SINGLE BLADDER, 34 IN. (86 CM)

## (undated) DEVICE — DRSG AQUACEL SURG 3.5 X 10IN -- CONVERT TO ITEM 370183

## (undated) DEVICE — HOOK LOCK LATEX FREE ELASTIC BANDAGE D/L 6INX10YD

## (undated) DEVICE — SUTURE VCRL SZ 1 L27IN ABSRB UD L36MM CP-1 1/2 CIR REV CUT J268H

## (undated) DEVICE — SYRINGE MED 20ML STD CLR PLAS LUERLOCK TIP N CTRL DISP

## (undated) DEVICE — T4 HOOD

## (undated) DEVICE — DRAPE,EXTREMITY,89X128,STERILE: Brand: MEDLINE

## (undated) DEVICE — STERILE POLYISOPRENE POWDER-FREE SURGICAL GLOVES WITH EMOLLIENT COATING: Brand: PROTEXIS

## (undated) DEVICE — YANKAUER OPEN TIP, NO VENT: Brand: ARGYLE

## (undated) DEVICE — SUTURE VCRL SZ 2-0 L27IN ABSRB UD L36MM CP-1 1/2 CIR REV J266H

## (undated) DEVICE — (D)PREP SKN CHLRAPRP APPL 26ML -- CONVERT TO ITEM 371833

## (undated) DEVICE — OPTIVAC KIT DOUBLE MIX 80GM: Brand: DJO SURGICAL

## (undated) DEVICE — TUBESET BNE PREP CO2 CARBOJET

## (undated) DEVICE — REM POLYHESIVE ADULT PATIENT RETURN ELECTRODE: Brand: VALLEYLAB